# Patient Record
Sex: MALE | Race: WHITE | Employment: FULL TIME | URBAN - METROPOLITAN AREA
[De-identification: names, ages, dates, MRNs, and addresses within clinical notes are randomized per-mention and may not be internally consistent; named-entity substitution may affect disease eponyms.]

---

## 2018-03-29 ENCOUNTER — HOSPITAL ENCOUNTER (EMERGENCY)
Age: 38
Discharge: HOME | End: 2018-03-29
Attending: EMERGENCY MEDICINE | Admitting: EMERGENCY MEDICINE
Payer: COMMERCIAL

## 2018-03-29 VITALS
TEMPERATURE: 97.7 F | RESPIRATION RATE: 18 BRPM | SYSTOLIC BLOOD PRESSURE: 159 MMHG | WEIGHT: 233.69 LBS | DIASTOLIC BLOOD PRESSURE: 96 MMHG | OXYGEN SATURATION: 95 % | BODY MASS INDEX: 31.65 KG/M2 | HEIGHT: 72 IN | HEART RATE: 87 BPM

## 2018-03-29 DIAGNOSIS — T78.40XA ALLERGIC REACTION, INITIAL ENCOUNTER: Primary | ICD-10-CM

## 2018-03-29 PROCEDURE — 99283 EMERGENCY DEPT VISIT LOW MDM: CPT

## 2018-03-29 PROCEDURE — 74011250637 HC RX REV CODE- 250/637: Performed by: EMERGENCY MEDICINE

## 2018-03-29 PROCEDURE — 74011636637 HC RX REV CODE- 636/637: Performed by: EMERGENCY MEDICINE

## 2018-03-29 RX ORDER — DIPHENHYDRAMINE HCL 25 MG
25 CAPSULE ORAL
Qty: 10 CAP | Refills: 0 | Status: SHIPPED | OUTPATIENT
Start: 2018-03-29

## 2018-03-29 RX ORDER — PREDNISONE 20 MG/1
60 TABLET ORAL
Status: COMPLETED | OUTPATIENT
Start: 2018-03-29 | End: 2018-03-29

## 2018-03-29 RX ORDER — DIPHENHYDRAMINE HCL 25 MG
25 CAPSULE ORAL
Status: COMPLETED | OUTPATIENT
Start: 2018-03-29 | End: 2018-03-29

## 2018-03-29 RX ORDER — PREDNISONE 5 MG/1
TABLET ORAL
Qty: 48 TAB | Refills: 0 | Status: SHIPPED | OUTPATIENT
Start: 2018-03-29

## 2018-03-29 RX ORDER — FAMOTIDINE 20 MG/1
20 TABLET, FILM COATED ORAL 2 TIMES DAILY
Qty: 20 TAB | Refills: 0 | Status: SHIPPED | OUTPATIENT
Start: 2018-03-29

## 2018-03-29 RX ORDER — FAMOTIDINE 20 MG/1
20 TABLET, FILM COATED ORAL
Status: COMPLETED | OUTPATIENT
Start: 2018-03-29 | End: 2018-03-29

## 2018-03-29 RX ADMIN — PREDNISONE 60 MG: 20 TABLET ORAL at 04:24

## 2018-03-29 RX ADMIN — FAMOTIDINE 20 MG: 20 TABLET ORAL at 04:23

## 2018-03-29 RX ADMIN — DIPHENHYDRAMINE HYDROCHLORIDE 25 MG: 25 CAPSULE ORAL at 04:23

## 2018-03-29 NOTE — DISCHARGE INSTRUCTIONS

## 2018-03-29 NOTE — ED NOTES
The patient was discharged home by Dr. Naomi Alcocer and Barb Jonas rn in stable condition, accompanied by family. The patient is alert and oriented, is in no respiratory distress and has vital signs within normal limits . The patient's diagnosis, condition and treatment were explained to patient. The patient expressed understanding. Prescriptions given to pt. No work/school note given to pt. A discharge plan has been developed. A  was not involved in the process. Aftercare instructions were given to the patient. Family will transport pt home.

## 2018-03-29 NOTE — ED TRIAGE NOTES
Pt reports being itchy and awoke at 0300 this AM with redness and hives to back and chest. Pt denies any known exposure. Pt denies any breathing or throat issues. Pt is able to speak in complete sentences and is handling his oral secretions normally. Pt had crab for dinner. Call bell within reach. Pt took 1 tab of over the counter benadryl.

## 2018-08-07 ENCOUNTER — HOSPITAL ENCOUNTER (EMERGENCY)
Age: 38
Discharge: HOME | End: 2018-08-07
Attending: EMERGENCY MEDICINE
Payer: COMMERCIAL

## 2018-08-07 ENCOUNTER — APPOINTMENT (OUTPATIENT)
Dept: GENERAL RADIOLOGY | Age: 38
End: 2018-08-07
Attending: EMERGENCY MEDICINE
Payer: COMMERCIAL

## 2018-08-07 ENCOUNTER — APPOINTMENT (OUTPATIENT)
Dept: CT IMAGING | Age: 38
End: 2018-08-07
Attending: EMERGENCY MEDICINE
Payer: COMMERCIAL

## 2018-08-07 VITALS
RESPIRATION RATE: 24 BRPM | HEIGHT: 72 IN | DIASTOLIC BLOOD PRESSURE: 91 MMHG | BODY MASS INDEX: 31.56 KG/M2 | WEIGHT: 233 LBS | TEMPERATURE: 99.2 F | OXYGEN SATURATION: 93 % | HEART RATE: 97 BPM | SYSTOLIC BLOOD PRESSURE: 170 MMHG

## 2018-08-07 DIAGNOSIS — R50.9 FEVER, UNSPECIFIED FEVER CAUSE: Primary | ICD-10-CM

## 2018-08-07 DIAGNOSIS — R51.9 ACUTE NONINTRACTABLE HEADACHE, UNSPECIFIED HEADACHE TYPE: ICD-10-CM

## 2018-08-07 LAB
ANION GAP BLD CALC-SCNC: 18 MMOL/L (ref 10–20)
ANION GAP SERPL CALC-SCNC: 12 MMOL/L (ref 5–15)
APPEARANCE UR: CLEAR
BACTERIA URNS QL MICRO: NEGATIVE /HPF
BASOPHILS # BLD: 0 K/UL (ref 0–0.1)
BASOPHILS NFR BLD: 0 % (ref 0–1)
BILIRUB UR QL: NEGATIVE
BUN BLD-MCNC: 15 MG/DL (ref 9–20)
BUN SERPL-MCNC: 15 MG/DL (ref 6–20)
BUN/CREAT SERPL: 11 (ref 12–20)
CA-I BLD-MCNC: 1.04 MMOL/L (ref 1.12–1.32)
CALCIUM SERPL-MCNC: 8.4 MG/DL (ref 8.5–10.1)
CHLORIDE BLD-SCNC: 95 MMOL/L (ref 98–107)
CHLORIDE SERPL-SCNC: 96 MMOL/L (ref 97–108)
CO2 BLD-SCNC: 24 MMOL/L (ref 21–32)
CO2 SERPL-SCNC: 24 MMOL/L (ref 21–32)
COLOR UR: NORMAL
CREAT BLD-MCNC: 1.2 MG/DL (ref 0.6–1.3)
CREAT SERPL-MCNC: 1.4 MG/DL (ref 0.7–1.3)
DIFFERENTIAL METHOD BLD: ABNORMAL
EOSINOPHIL # BLD: 0 K/UL (ref 0–0.4)
EOSINOPHIL NFR BLD: 0 % (ref 0–7)
EPITH CASTS URNS QL MICRO: NORMAL /LPF
ERYTHROCYTE [DISTWIDTH] IN BLOOD BY AUTOMATED COUNT: 13.2 % (ref 11.5–14.5)
GLUCOSE BLD-MCNC: 102 MG/DL (ref 65–100)
GLUCOSE SERPL-MCNC: 97 MG/DL (ref 65–100)
GLUCOSE UR STRIP.AUTO-MCNC: NEGATIVE MG/DL
HCT VFR BLD AUTO: 39.9 % (ref 36.6–50.3)
HCT VFR BLD CALC: 41 % (ref 36.6–50.3)
HGB BLD-MCNC: 13.9 G/DL (ref 12.1–17)
HGB UR QL STRIP: NEGATIVE
HYALINE CASTS URNS QL MICRO: NORMAL /LPF (ref 0–5)
IMM GRANULOCYTES # BLD: 0 K/UL (ref 0–0.04)
IMM GRANULOCYTES NFR BLD AUTO: 0 % (ref 0–0.5)
KETONES UR QL STRIP.AUTO: NEGATIVE MG/DL
LACTATE SERPL-SCNC: 1.2 MMOL/L (ref 0.4–2)
LEUKOCYTE ESTERASE UR QL STRIP.AUTO: NEGATIVE
LYMPHOCYTES # BLD: 0.2 K/UL (ref 0.8–3.5)
LYMPHOCYTES NFR BLD: 3 % (ref 12–49)
MCH RBC QN AUTO: 30.1 PG (ref 26–34)
MCHC RBC AUTO-ENTMCNC: 34.8 G/DL (ref 30–36.5)
MCV RBC AUTO: 86.4 FL (ref 80–99)
MONOCYTES # BLD: 0.2 K/UL (ref 0–1)
MONOCYTES NFR BLD: 2 % (ref 5–13)
NEUTS SEG # BLD: 7.4 K/UL (ref 1.8–8)
NEUTS SEG NFR BLD: 95 % (ref 32–75)
NITRITE UR QL STRIP.AUTO: NEGATIVE
NRBC # BLD: 0 K/UL (ref 0–0.01)
NRBC BLD-RTO: 0 PER 100 WBC
PH UR STRIP: 7 [PH] (ref 5–8)
PLATELET # BLD AUTO: 178 K/UL (ref 150–400)
PMV BLD AUTO: 11 FL (ref 8.9–12.9)
POTASSIUM BLD-SCNC: 3.5 MMOL/L (ref 3.5–5.1)
POTASSIUM SERPL-SCNC: 3.5 MMOL/L (ref 3.5–5.1)
PROT UR STRIP-MCNC: NEGATIVE MG/DL
RBC # BLD AUTO: 4.62 M/UL (ref 4.1–5.7)
RBC #/AREA URNS HPF: NORMAL /HPF (ref 0–5)
RBC MORPH BLD: ABNORMAL
SERVICE CMNT-IMP: ABNORMAL
SODIUM BLD-SCNC: 133 MMOL/L (ref 136–145)
SODIUM SERPL-SCNC: 132 MMOL/L (ref 136–145)
SP GR UR REFRACTOMETRY: <1.005 (ref 1–1.03)
UA: UC IF INDICATED,UAUC: NORMAL
UROBILINOGEN UR QL STRIP.AUTO: 0.2 EU/DL (ref 0.2–1)
WBC # BLD AUTO: 7.8 K/UL (ref 4.1–11.1)
WBC URNS QL MICRO: NORMAL /HPF (ref 0–4)

## 2018-08-07 PROCEDURE — 96374 THER/PROPH/DIAG INJ IV PUSH: CPT

## 2018-08-07 PROCEDURE — 87040 BLOOD CULTURE FOR BACTERIA: CPT | Performed by: EMERGENCY MEDICINE

## 2018-08-07 PROCEDURE — 70450 CT HEAD/BRAIN W/O DYE: CPT

## 2018-08-07 PROCEDURE — 74011636320 HC RX REV CODE- 636/320: Performed by: EMERGENCY MEDICINE

## 2018-08-07 PROCEDURE — 93005 ELECTROCARDIOGRAM TRACING: CPT

## 2018-08-07 PROCEDURE — 71046 X-RAY EXAM CHEST 2 VIEWS: CPT

## 2018-08-07 PROCEDURE — 80048 BASIC METABOLIC PNL TOTAL CA: CPT | Performed by: EMERGENCY MEDICINE

## 2018-08-07 PROCEDURE — 36415 COLL VENOUS BLD VENIPUNCTURE: CPT | Performed by: EMERGENCY MEDICINE

## 2018-08-07 PROCEDURE — 99285 EMERGENCY DEPT VISIT HI MDM: CPT

## 2018-08-07 PROCEDURE — 81001 URINALYSIS AUTO W/SCOPE: CPT | Performed by: EMERGENCY MEDICINE

## 2018-08-07 PROCEDURE — 74011250637 HC RX REV CODE- 250/637: Performed by: EMERGENCY MEDICINE

## 2018-08-07 PROCEDURE — 74011250636 HC RX REV CODE- 250/636: Performed by: EMERGENCY MEDICINE

## 2018-08-07 PROCEDURE — 80047 BASIC METABLC PNL IONIZED CA: CPT

## 2018-08-07 PROCEDURE — 83605 ASSAY OF LACTIC ACID: CPT | Performed by: EMERGENCY MEDICINE

## 2018-08-07 PROCEDURE — 96375 TX/PRO/DX INJ NEW DRUG ADDON: CPT

## 2018-08-07 PROCEDURE — 77030019701 HC TY LUMBR PUNC SIMS -A

## 2018-08-07 PROCEDURE — 85025 COMPLETE CBC W/AUTO DIFF WBC: CPT | Performed by: EMERGENCY MEDICINE

## 2018-08-07 PROCEDURE — 96361 HYDRATE IV INFUSION ADD-ON: CPT

## 2018-08-07 PROCEDURE — 70496 CT ANGIOGRAPHY HEAD: CPT

## 2018-08-07 RX ORDER — DOXYCYCLINE HYCLATE 100 MG
100 TABLET ORAL 2 TIMES DAILY
Qty: 20 TAB | Refills: 0 | Status: SHIPPED | OUTPATIENT
Start: 2018-08-07 | End: 2018-08-17

## 2018-08-07 RX ORDER — ACETAMINOPHEN 500 MG
1000 TABLET ORAL ONCE
Status: COMPLETED | OUTPATIENT
Start: 2018-08-07 | End: 2018-08-07

## 2018-08-07 RX ORDER — METOCLOPRAMIDE HYDROCHLORIDE 5 MG/ML
10 INJECTION INTRAMUSCULAR; INTRAVENOUS
Status: COMPLETED | OUTPATIENT
Start: 2018-08-07 | End: 2018-08-07

## 2018-08-07 RX ORDER — DIPHENHYDRAMINE HYDROCHLORIDE 50 MG/ML
50 INJECTION, SOLUTION INTRAMUSCULAR; INTRAVENOUS
Status: COMPLETED | OUTPATIENT
Start: 2018-08-07 | End: 2018-08-07

## 2018-08-07 RX ADMIN — SODIUM CHLORIDE 1000 ML: 900 INJECTION, SOLUTION INTRAVENOUS at 19:19

## 2018-08-07 RX ADMIN — METOCLOPRAMIDE 10 MG: 5 INJECTION, SOLUTION INTRAMUSCULAR; INTRAVENOUS at 17:01

## 2018-08-07 RX ADMIN — DIPHENHYDRAMINE HYDROCHLORIDE 50 MG: 50 INJECTION, SOLUTION INTRAMUSCULAR; INTRAVENOUS at 17:01

## 2018-08-07 RX ADMIN — IOPAMIDOL 100 ML: 755 INJECTION, SOLUTION INTRAVENOUS at 18:04

## 2018-08-07 RX ADMIN — SODIUM CHLORIDE 1000 ML: 900 INJECTION, SOLUTION INTRAVENOUS at 17:01

## 2018-08-07 RX ADMIN — ACETAMINOPHEN 1000 MG: 500 TABLET ORAL at 18:10

## 2018-08-07 NOTE — ED TRIAGE NOTES
Patient reports headache starting at 1400 which then became generalized body aches and chills. Patient appears pale in triage. Patient adds right side numbness that just began.  patient taken to room 5 for rapid EKG

## 2018-08-07 NOTE — LETTER
[address] 
[facility] EMERGENCY DEPT 
[address] [facility] [address] [id number] [phone] Work/School Note Date: 8/7/2018 To Whom It May concern: 
 
Derick Grijalva was seen and treated today in the emergency room by the following provider(s): 
Attending Provider: Dione Lugo MD.   
 
Derick Grijalva may return to work on 8/10/18. Sincerely, Dione Lugo MD

## 2018-08-07 NOTE — ED PROVIDER NOTES
HPI Comments: Patient presents with a headache that started suddenly at 2pm and quickly worsened to an 8/10 by 3pm.  Patient then reported right sided numbness that developed into bilateral numbness and bilateral tingling as well as diffuse body aches and weakness. He denies focal weakness, fevers, chills, shortness of breath. Patient is a 41 y.o. male presenting with headaches. Headache    This is a new problem. The current episode started 3 to 5 hours ago. The problem occurs constantly. The problem has not changed since onset. The headache is aggravated by nothing. The pain is located in the generalized region. The quality of the pain is described as throbbing. The pain is at a severity of 8/10. Associated symptoms include malaise/fatigue, weakness, dizziness and nausea. Pertinent negatives include no fever, no shortness of breath, no visual change and no vomiting. He has tried nothing for the symptoms. The treatment provided no relief. No past medical history on file. Past Surgical History:   Procedure Laterality Date    HX ORTHOPAEDIC           No family history on file. Social History     Social History    Marital status: SINGLE     Spouse name: N/A    Number of children: N/A    Years of education: N/A     Occupational History    Not on file. Social History Main Topics    Smoking status: Never Smoker    Smokeless tobacco: Never Used    Alcohol use Yes      Comment: occasional    Drug use: No    Sexual activity: Not on file     Other Topics Concern    Not on file     Social History Narrative         ALLERGIES: Review of patient's allergies indicates no known allergies. Review of Systems   Constitutional: Positive for fatigue and malaise/fatigue. Negative for fever. Respiratory: Negative for shortness of breath. Gastrointestinal: Positive for nausea. Negative for vomiting. Musculoskeletal: Negative for neck pain and neck stiffness.    Neurological: Positive for dizziness, weakness, light-headedness, numbness and headaches. Negative for syncope. Vitals:    08/07/18 1621   BP: 133/90   Pulse: (!) 109   Resp: 22   Temp: 98.7 °F (37.1 °C)   SpO2: 96%   Weight: 105.7 kg (233 lb)   Height: 6' (1.829 m)            Physical Exam   Constitutional: He is oriented to person, place, and time. He appears well-developed. No distress. HENT:   Head: Normocephalic and atraumatic. Eyes: Pupils are equal, round, and reactive to light. No scleral icterus. Neck: Normal range of motion. Neck supple. No rigidity. Normal range of motion present. Cardiovascular: Normal rate and regular rhythm. Pulmonary/Chest: Effort normal and breath sounds normal. Tachypnea noted. Patient hyperventilating with short, shallow breaths   Abdominal: Soft. He exhibits no distension. There is no tenderness. There is no rebound and no guarding. Musculoskeletal: Normal range of motion. Neurological: He is alert and oriented to person, place, and time. No cranial nerve deficit or sensory deficit. GCS eye subscore is 4. GCS verbal subscore is 5. GCS motor subscore is 6. Equal strength bilaterally but poor effort secondary to pain. Skin: Skin is warm and dry. He is not diaphoretic. Psychiatric: His behavior is normal. Judgment and thought content normal. His mood appears anxious. His speech is rapid and/or pressured. Cognition and memory are normal.   Nursing note and vitals reviewed. MDM  Number of Diagnoses or Management Options  Diagnosis management comments: 7:01 PM  Patient presents with headache and fevers - no neck stiffness. I recommended that patient get an LP to evaluate for a possible bacterial meningitis given his symptoms on presentation but patient declines an LP at this time as he is feeling better after IV fluids and medication.   He has a supple neck and is currently curled up in the fetal position, so a meningitis is less likely; nevertheless, I reviewed the risks, signs, and symptoms of meningitis with the patient as well as the risks of an LP. He understands the risks of declining the procedure, including worsening untreated bacterial meningitis leading to disability and/or death, and will return if symptoms worsen or change. At this time the patient is now resting comfortably and feels better, is alert, talkative, interactive and in no distress. The patient appears well and is able to tolerate PO fluids. The repeat examination is unremarkable and benign. The patient is neurologically intact, has a normal mental status, and is ambulatory in the ED. The history, exam, diagnostic testing (if any) and the patient's current condition do not suggest meningitis, stroke, sepsis, subarachnoid hemorrhage, intracranial bleeding, encephalitis, or temporal arteritis. The patient is resting comfortably and feels better, is alert and in no distress. On re-examination the patient does not appear toxic and has no meningeal signs, and there is no intractable vomiting, no respiratory distress and no apparent pain. Based on the history, exam, diagnostic testing (if any) and reassessment, the patient has no signs of meningitis, significant pneumonia, or pyelonephritis. The patient's condition is stable and is appropriate for discharge. The patient or caregiver will pursue further outpatient evaluation with the primary care physician as indicated in the discharge instructions. ED Course       Procedures      The patient's results have been reviewed with them and/or available family. Patient and/or family verbally conveyed their understanding and agreement of the patient's signs, symptoms, diagnosis, treatment and prognosis and additionally agree to follow up as recommended in the discharge instructions or to return to the Emergency Room should their condition change prior to their follow-up appointment.  The patient/family verbally agrees with the care-plan and verbally conveys that all of their questions have been answered. The discharge instructions have also been provided to the patient and/or family with some educational information regarding the patient's diagnosis as well a list of reasons why the patient would want to return to the ER prior to their follow-up appointment, should their condition change.

## 2018-08-08 LAB
ATRIAL RATE: 104 BPM
CALCULATED P AXIS, ECG09: 52 DEGREES
CALCULATED R AXIS, ECG10: 61 DEGREES
CALCULATED T AXIS, ECG11: -7 DEGREES
DIAGNOSIS, 93000: NORMAL
P-R INTERVAL, ECG05: 116 MS
Q-T INTERVAL, ECG07: 324 MS
QRS DURATION, ECG06: 90 MS
QTC CALCULATION (BEZET), ECG08: 426 MS
VENTRICULAR RATE, ECG03: 104 BPM

## 2018-08-11 ENCOUNTER — HOSPITAL ENCOUNTER (EMERGENCY)
Age: 38
Discharge: HOME | End: 2018-08-11
Attending: EMERGENCY MEDICINE | Admitting: EMERGENCY MEDICINE
Payer: COMMERCIAL

## 2018-08-11 VITALS
TEMPERATURE: 96.7 F | OXYGEN SATURATION: 96 % | HEIGHT: 72 IN | WEIGHT: 230 LBS | SYSTOLIC BLOOD PRESSURE: 157 MMHG | DIASTOLIC BLOOD PRESSURE: 108 MMHG | RESPIRATION RATE: 12 BRPM | HEART RATE: 80 BPM | BODY MASS INDEX: 31.15 KG/M2

## 2018-08-11 DIAGNOSIS — F15.93 CAFFEINE WITHDRAWAL (HCC): Primary | ICD-10-CM

## 2018-08-11 DIAGNOSIS — G44.40 REBOUND HEADACHE: ICD-10-CM

## 2018-08-11 PROCEDURE — 74011250637 HC RX REV CODE- 250/637: Performed by: EMERGENCY MEDICINE

## 2018-08-11 PROCEDURE — 99282 EMERGENCY DEPT VISIT SF MDM: CPT

## 2018-08-11 RX ORDER — DIPHENHYDRAMINE HYDROCHLORIDE 50 MG/ML
25 INJECTION, SOLUTION INTRAMUSCULAR; INTRAVENOUS
Status: DISCONTINUED | OUTPATIENT
Start: 2018-08-11 | End: 2018-08-11

## 2018-08-11 RX ORDER — METOCLOPRAMIDE HYDROCHLORIDE 5 MG/ML
10 INJECTION INTRAMUSCULAR; INTRAVENOUS
Status: DISCONTINUED | OUTPATIENT
Start: 2018-08-11 | End: 2018-08-11

## 2018-08-11 RX ORDER — KETOROLAC TROMETHAMINE 30 MG/ML
15 INJECTION, SOLUTION INTRAMUSCULAR; INTRAVENOUS
Status: DISCONTINUED | OUTPATIENT
Start: 2018-08-11 | End: 2018-08-11

## 2018-08-11 RX ORDER — BUTALBITAL, ACETAMINOPHEN AND CAFFEINE 50; 325; 40 MG/1; MG/1; MG/1
2 TABLET ORAL
Status: COMPLETED | OUTPATIENT
Start: 2018-08-11 | End: 2018-08-11

## 2018-08-11 RX ORDER — DEXAMETHASONE SODIUM PHOSPHATE 10 MG/ML
10 INJECTION INTRAMUSCULAR; INTRAVENOUS
Status: DISCONTINUED | OUTPATIENT
Start: 2018-08-11 | End: 2018-08-11

## 2018-08-11 RX ADMIN — BUTALBITAL, ACETAMINOPHEN, AND CAFFEINE 2 TABLET: 50; 325; 40 TABLET ORAL at 17:34

## 2018-08-11 NOTE — ED TRIAGE NOTES
Pt states he has had a headache since Tuesday and was seen here then, pt states he still has the headache and has been taking tylenol and motrin at home with no relief, pt denies any nausea or vomiting.

## 2018-08-11 NOTE — ED PROVIDER NOTES
HPI Comments: 41 y.o. male with no significant past medical history who presents from home via a private vehicle with chief complaint of a headache. Pt reports the initial onset of his headache was on 8/7/18 at around 1400 and was seen here in the ED. Pt reports he was given Reglan and his headache improved. Pt reports he was discharged with a prescription for Doxycycline, was recommended to take motrin, and follow up with [facility] for any changes in his symptoms. Pt reports his headache, though less severe, returned soon after being discharged. Pt reports he went to [facility] on 8/9/18 for his worsening headache and was told to alternated between Motrin and Tylenol and to take Benadryl. Pt reports taking these medications has not relieved his headache. Furthermore, pt reports he is having difficulty sleeping secondary to his headache. In the ED, pt reports his headache is dull and constant pain across his upper forehead. Of note, pt admits he is used to drinking two Monster energy drinks daily. However, pt reports he has not had one since the morning of 8/7/18 because he developed a headache. Pt states he drinks the energy drinks because of symptoms from his history of low testosterone. Pt denies any fever, chills, dizziness, nausea, or vomiting. There are no other acute medical concerns at this time. Social hx - Tobacco use: never, Alcohol Use: yes    Note written by Lucas Mcpherson, as dictated by Sarthak De La Rosa MD 5:00 PM.        The history is provided by the patient. No  was used. No past medical history on file. Past Surgical History:   Procedure Laterality Date    HX ORTHOPAEDIC           No family history on file. Social History     Social History    Marital status: SINGLE     Spouse name: N/A    Number of children: N/A    Years of education: N/A     Occupational History    Not on file.      Social History Main Topics    Smoking status: Never Smoker    Smokeless tobacco: Never Used    Alcohol use Yes      Comment: occasional    Drug use: No    Sexual activity: Not on file     Other Topics Concern    Not on file     Social History Narrative         ALLERGIES: Review of patient's allergies indicates no known allergies. Review of Systems   Constitutional: Negative for chills and fever. HENT: Negative for ear pain and sore throat. Eyes: Negative for pain. Respiratory: Negative for chest tightness and shortness of breath. Cardiovascular: Negative for chest pain and leg swelling. Gastrointestinal: Negative for abdominal pain, nausea and vomiting. Genitourinary: Negative for dysuria and flank pain. Musculoskeletal: Negative for back pain. Skin: Negative for rash. Neurological: Positive for headaches. Negative for dizziness. All other systems reviewed and are negative. Vitals:    08/11/18 1605   BP: (!) 143/99   Pulse: 85   Resp: 18   Temp: 98.5 °F (36.9 °C)   SpO2: 97%   Weight: 104.3 kg (230 lb)   Height: 6' (1.829 m)            Physical Exam   Constitutional: He is oriented to person, place, and time. He appears well-developed and well-nourished. No distress. HENT:   Head: Normocephalic and atraumatic. Eyes: Conjunctivae and EOM are normal. Pupils are equal, round, and reactive to light. Neck: Neck supple. No tracheal deviation present. Cardiovascular: Normal rate and regular rhythm. Pulmonary/Chest: Effort normal. No respiratory distress. Abdominal: Soft. He exhibits no distension. Musculoskeletal: Normal range of motion. Neurological: He is alert and oriented to person, place, and time. Normal finger-nose-finger. Normal heel to shin. Normal rapid alternating movement. Skin: Skin is warm and dry. Psychiatric: He has a normal mood and affect. Nursing note and vitals reviewed.      Note written by Lucas Pérez, as dictated by Ellison Najjar, MD 6:11 PM.    MDM    41 y.o. male presents with headaches that have been persistent since intiial visit. Extensive workup including CT/CTA which showed no bleeding and no aneurysm. Previously discussed LP for further evaluation but had declined. Pt was drinking 2 monster energy drinks daily previous to onset and has since been abstinent from caffeine. He was provided 2 fioricet here and given a caffeinated soda with complete resolution. No longer having fevers which he had initially so I recommended completing course of doxy. Suspect caffeine withdrawal headache which responded well to treatment here. Discussed resuming caffeine at home and slow tapering to discontinuation. Plan to follow up with PCP as needed and return precautions discussed for worsening or new concerning symptoms. ED Course       Procedures    PROGRESS NOTE:  7:22 PM  Pt reports his headache has completely resolved. Pt states he feels well and agrees to plan as outlined with him. Will discharge pt home. 7:23 PM  Patient's results have been reviewed with them. Patient and/or family have verbally conveyed their understanding and agreement of the patient's signs, symptoms, diagnosis, treatment and prognosis and additionally agree to follow up as recommended or return to the Emergency Room should their condition change prior to follow-up. Discharge instructions have also been provided to the patient with some educational information regarding their diagnosis as well a list of reasons why they would want to return to the ER prior to their follow-up appointment should their condition change.

## 2018-08-11 NOTE — DISCHARGE INSTRUCTIONS

## 2018-08-11 NOTE — ED NOTES
The provider has reviewed discharge instructions with the patient. The patient verbalized understanding. The patient was able to ambulate to the exit with a steady gait and did not appear to be in any distress.

## 2018-08-12 LAB
BACTERIA SPEC CULT: NORMAL
SERVICE CMNT-IMP: NORMAL

## 2019-06-28 ENCOUNTER — OFFICE VISIT (OUTPATIENT)
Dept: PRIMARY CARE CLINIC | Age: 39
End: 2019-06-28

## 2019-06-28 VITALS
WEIGHT: 238.8 LBS | HEART RATE: 75 BPM | BODY MASS INDEX: 32.34 KG/M2 | SYSTOLIC BLOOD PRESSURE: 134 MMHG | RESPIRATION RATE: 16 BRPM | TEMPERATURE: 97.8 F | DIASTOLIC BLOOD PRESSURE: 92 MMHG | HEIGHT: 72 IN | OXYGEN SATURATION: 96 %

## 2019-06-28 DIAGNOSIS — M25.562 CHRONIC PAIN OF LEFT KNEE: ICD-10-CM

## 2019-06-28 DIAGNOSIS — M25.512 CHRONIC LEFT SHOULDER PAIN: ICD-10-CM

## 2019-06-28 DIAGNOSIS — Z00.00 ROUTINE PHYSICAL EXAMINATION: Primary | ICD-10-CM

## 2019-06-28 DIAGNOSIS — J40 BRONCHITIS: ICD-10-CM

## 2019-06-28 DIAGNOSIS — R79.89 LOW TESTOSTERONE IN MALE: ICD-10-CM

## 2019-06-28 DIAGNOSIS — Z76.89 ENCOUNTER TO ESTABLISH CARE: ICD-10-CM

## 2019-06-28 DIAGNOSIS — Z98.890 S/P RIGHT KNEE SURGERY: ICD-10-CM

## 2019-06-28 DIAGNOSIS — G89.29 CHRONIC PAIN OF LEFT KNEE: ICD-10-CM

## 2019-06-28 DIAGNOSIS — G89.29 CHRONIC LEFT SHOULDER PAIN: ICD-10-CM

## 2019-06-28 NOTE — PROGRESS NOTES
[facility]   [address], [address] [facility]  P: [phone]  F: [phone]      Chief Complaint   Patient presents with   [address]     two years ago was down and he [facility] and he was told about that he has low testosterone and no energy. did a year but was told that he is alergic cotton seed oil which is in the injection and he was hot and itchy and went to er       [address] is a 42 y.o. male who presents to clinic for Establish Care (two years ago was down and he went to [facility] and he was told about that he has low testosterone and no energy. did a year but was told that he is alergic cotton seed oil which is in the injection and he was hot and itchy and went to er). HPI:    Dalia Harper is a 47-year-old male who presents today as a new patient to establish care, and for full physical and will return for fasting labs. He denies a previous primary care provider. He states [facility] and underwent testosterone injections for about a year, but stopped therapy due to allergy to cottonseed oil which was in their injections. Today he presents with complaint of low sex drive, fatigue, and multiple joint complaints including left knee and left shoulder, history of previous surgery to his right knee. Psych/social: He is an [place], serving 3 tours. He has a 8year-old son was previously . He is currently working as both a  and delivers hay. He is a non-smoker and drinks alcohol 1-2 drinks on the weekend only. He drinks 1 Monster caffeinated soda daily. There are no active problems to display for this patient. No past medical history on file. Past Surgical History:   Procedure Laterality Date    HX ORTHOPAEDIC      2 on right knee will have one on the left knee soon.       Social History     Socioeconomic History    Marital status:      Spouse name: Not on file    Number of children: Not on file    Years of education: Not on file    Highest education level: Not on file   Occupational History    Not on file   Social Needs    Financial resource strain: Not on file    Food insecurity:     Worry: Not on file     Inability: Not on file    Transportation needs:     Medical: Not on file     Non-medical: Not on file   Tobacco Use    Smoking status: Never Smoker    Smokeless tobacco: Never Used   Substance and Sexual Activity    Alcohol use: Yes    Drug use: Never    Sexual activity: Yes     Partners: Female   Lifestyle    Physical activity:     Days per week: Not on file     Minutes per session: Not on file    Stress: Not on file   Relationships    Social connections:     Talks on phone: Not on file     Gets together: Not on file     Attends [employer] service: Not on file     Active member of club or organization: Not on file     Attends meetings of clubs or organizations: Not on file     Relationship status: Not on file    Intimate partner violence:     Fear of current or ex partner: Not on file     Emotionally abused: Not on file     Physically abused: Not on file     Forced sexual activity: Not on file   Other Topics Concern    Not on file   Social History Narrative    Not on file     No family history on file. No Known Allergies    The medications were reviewed and updated in the medical record. The past medical history, past surgical history, and family history were reviewed and updated in the medical record. REVIEW OF SYSTEMS   Review of Systems   Constitutional: Positive for malaise/fatigue. Negative for fever. HENT: Negative for congestion. Eyes: Negative for blurred vision and pain. Respiratory: Negative for cough and shortness of breath. Cardiovascular: Negative for chest pain and palpitations. Gastrointestinal: Negative for abdominal pain and heartburn. Genitourinary: Negative for frequency and urgency.    Musculoskeletal: Negative for joint pain and myalgias. Neurological: Negative for dizziness, tingling, sensory change, weakness and headaches. Psychiatric/Behavioral: Negative for depression, memory loss and substance abuse. PHYSICAL EXAM     Visit Vitals  BP (!) 134/92 (BP 1 Location: Left arm, BP Patient Position: Sitting)   Pulse 75   Temp 97.8 °F (36.6 °C) (Oral)   Resp 16   Ht 6' (1.829 m)   Wt 238 lb 12.8 oz (108.3 kg)   SpO2 96%   BMI 32.39 kg/m²       Physical Exam   Constitutional: He is oriented to person, place, and time and well-developed, well-nourished, and in no distress. Vital signs are normal.   BMI 32   HENT:   Head: Normocephalic and atraumatic. Right Ear: Hearing, tympanic membrane, external ear and ear canal normal.   Left Ear: Hearing, tympanic membrane, external ear and ear canal normal.   Nose: Nose normal.   Mouth/Throat: Uvula is midline, oropharynx is clear and moist and mucous membranes are normal.   Eyes: Right eye visual fields normal and left eye visual fields normal. Pupils are equal, round, and reactive to light. Conjunctivae, EOM and lids are normal.   Fundoscopic exam:       The right eye shows no AV nicking. The left eye shows no AV nicking. Neck: Trachea normal. No thyromegaly present. Cardiovascular: S1 normal, S2 normal and normal heart sounds. Exam reveals no gallop and no friction rub. No murmur heard. Pulmonary/Chest: Effort normal and breath sounds normal.   Abdominal: Soft. Normal appearance. There is no hepatosplenomegaly. There is no tenderness. Musculoskeletal: Normal range of motion. Left shoulder: He exhibits pain. Left knee: He exhibits swelling (positive crepitus). Neurological: He is alert and oriented to person, place, and time. He has normal reflexes. Gait normal.   Reflex Scores:       Patellar reflexes are 2+ on the right side and 2+ on the left side. Skin: Skin is warm, dry and intact.    Psychiatric: Mood, affect and judgment normal. ASSESSMENT/ PLAN   Diagnoses and all orders for this visit:    1. Routine physical examination  -     TESTOSTERONE, TOTAL, ADULT MALE  -     TSH 3RD GENERATION  -     METABOLIC PANEL, COMPREHENSIVE  -     CBC W/O DIFF  -     LIPID PANEL  Reviewed age appropriate safety and screening guidelines, as well as maintaining a healthy diet and exercise 150 minutes or more weekly. Wear SPF 15 or greater sunscreen and please wear seatbelt. Discussed safe sex practices. Patient with no further questions today. 2. Encounter to establish care    3. Bronchitis     Occurring episodically over the last 1 year, not currently an issue. 4. Chronic left shoulder pain    5. Low testosterone in male  -     TESTOSTERONE, TOTAL, ADULT MALE  -Discussed resuming testosterone therapy today if total testosterone is low. Patient may have to try AndroGel if he does not tolerate the injections. 6. S/P right knee surgery    7. Chronic pain of left knee    For multiple orthopedic complaints encourage patient to follow-up with the [facility]. He declines resources for me today. Disclaimer:  Advised patient to call back or return to office if symptoms worsen/change/persist.  Discussed expected course/resolution/complications of diagnosis in detail with patient.     Medication risks/benefits/alternatives discussed with patient. Patient was given an after visit summary which includes diagnoses, current medications, & vitals.      Discussed patient instructions and advised to read to all patient instructions regarding care.      Patient expressed understanding with the diagnosis and plan. This note will not be viewable in [address]         Rao Alvarado NP  6/28/2019        (This document has been electronically signed)

## 2019-06-28 NOTE — PROGRESS NOTES
Chief Complaint   Patient presents with   [address]     two years ago was down and he [facility] to [facility] and he was told about that he has low testosterone and no energy.   did a year but was told that he is alergic cotton seed oil which is in the injection and he was hot and itchy and went to er

## 2019-07-10 LAB
ALBUMIN SERPL-MCNC: 4.8 G/DL (ref 3.5–5.5)
ALBUMIN/GLOB SERPL: 2 {RATIO} (ref 1.2–2.2)
ALP SERPL-CCNC: 83 IU/L (ref 39–117)
ALT SERPL-CCNC: 28 IU/L (ref 0–44)
AST SERPL-CCNC: 22 IU/L (ref 0–40)
BILIRUB SERPL-MCNC: 0.4 MG/DL (ref 0–1.2)
BUN SERPL-MCNC: 14 MG/DL (ref 6–20)
BUN/CREAT SERPL: 12 (ref 9–20)
CALCIUM SERPL-MCNC: 9.6 MG/DL (ref 8.7–10.2)
CHLORIDE SERPL-SCNC: 102 MMOL/L (ref 96–106)
CHOLEST SERPL-MCNC: 196 MG/DL (ref 100–199)
CO2 SERPL-SCNC: 24 MMOL/L (ref 20–29)
CREAT SERPL-MCNC: 1.18 MG/DL (ref 0.76–1.27)
ERYTHROCYTE [DISTWIDTH] IN BLOOD BY AUTOMATED COUNT: 14.4 % (ref 12.3–15.4)
GLOBULIN SER CALC-MCNC: 2.4 G/DL (ref 1.5–4.5)
GLUCOSE SERPL-MCNC: 99 MG/DL (ref 65–99)
HCT VFR BLD AUTO: 43.7 % (ref 37.5–51)
HDLC SERPL-MCNC: 42 MG/DL
HGB BLD-MCNC: 15 G/DL (ref 13–17.7)
LDLC SERPL CALC-MCNC: 128 MG/DL (ref 0–99)
MCH RBC QN AUTO: 29.3 PG (ref 26.6–33)
MCHC RBC AUTO-ENTMCNC: 34.3 G/DL (ref 31.5–35.7)
MCV RBC AUTO: 85 FL (ref 79–97)
PLATELET # BLD AUTO: 255 X10E3/UL (ref 150–450)
POTASSIUM SERPL-SCNC: 4.6 MMOL/L (ref 3.5–5.2)
PROT SERPL-MCNC: 7.2 G/DL (ref 6–8.5)
RBC # BLD AUTO: 5.12 X10E6/UL (ref 4.14–5.8)
SODIUM SERPL-SCNC: 141 MMOL/L (ref 134–144)
TESTOST SERPL-MCNC: 312 NG/DL (ref 264–916)
TRIGL SERPL-MCNC: 132 MG/DL (ref 0–149)
TSH SERPL DL<=0.005 MIU/L-ACNC: 2.27 UIU/ML (ref 0.45–4.5)
VLDLC SERPL CALC-MCNC: 26 MG/DL (ref 5–40)
WBC # BLD AUTO: 4.6 X10E3/UL (ref 3.4–10.8)

## 2019-07-10 NOTE — PROGRESS NOTES
Please call- labs show high LDL cholesterol but otherwise are all at goal for him. Work on [employer] and increase exercise. Testosterone is at 312 so I would not resume injections.

## 2019-09-25 ENCOUNTER — OFFICE VISIT (OUTPATIENT)
Dept: PRIMARY CARE CLINIC | Age: 39
End: 2019-09-25

## 2019-09-25 VITALS
RESPIRATION RATE: 16 BRPM | HEIGHT: 72 IN | HEART RATE: 67 BPM | BODY MASS INDEX: 31.72 KG/M2 | DIASTOLIC BLOOD PRESSURE: 86 MMHG | SYSTOLIC BLOOD PRESSURE: 128 MMHG | WEIGHT: 234.2 LBS | TEMPERATURE: 97.8 F | OXYGEN SATURATION: 95 %

## 2019-09-25 DIAGNOSIS — N52.9 ERECTILE DYSFUNCTION, UNSPECIFIED ERECTILE DYSFUNCTION TYPE: ICD-10-CM

## 2019-09-25 DIAGNOSIS — J06.9 UPPER RESPIRATORY TRACT INFECTION, UNSPECIFIED TYPE: Primary | ICD-10-CM

## 2019-09-25 RX ORDER — SILDENAFIL 50 MG/1
TABLET, FILM COATED ORAL
Qty: 15 TAB | Refills: 0 | Status: SHIPPED | OUTPATIENT
Start: 2019-09-25

## 2019-09-25 RX ORDER — AZITHROMYCIN 250 MG/1
TABLET, FILM COATED ORAL
Qty: 6 TAB | Refills: 0 | Status: SHIPPED | OUTPATIENT
Start: 2019-09-25 | End: 2019-09-30

## 2019-09-25 NOTE — PROGRESS NOTES
[facility]   [address], [address] [facility]  P: [phone]  F: [phone]      Chief Complaint   Patient presents with    Cough     started on Sunday woke coughing up brown stuff.  states no other symptoms but it isnt getting better. SUBJECTIVE   Ceasar Rodriguez II is a 42 y.o. male who presents to clinic for Cough (started on Sunday woke coughing up brown stuff.  states no other symptoms but it isnt getting better. ). HPI:    Howard Quezada is a 57-year-old male who presents today for 5 days of a cough productive of green/brown sputum. He has been taking Mucinex OTC without much relief in symptoms. He denies any fevers or shortness of breath today. He was possibly exposed to toxic substances during his time in the [employer], and he states he is normally treated for [facility]ch[facility]  He states he has similar episodes at least twice a year of cough with sputum production. He is a current non-smoker. He states he is does not have a past medical history for seasonal allergies. He does continue to have issues with ED, specifically getting an erection. He is requesting medication today to help, as he states that his testosterone was low normal when last checked. There are no active problems to display for this patient. History reviewed. No pertinent past medical history. Past Surgical History:   Procedure Laterality Date    HX ORTHOPAEDIC      2 on right knee will have one on the left knee soon.       Social History     Socioeconomic History    Marital status:      Spouse name: Not on file    Number of children: Not on file    Years of education: Not on file    Highest education level: Not on file   Occupational History    Not on file   Social Needs    Financial resource strain: Not on file    Food insecurity:     Worry: Not on file     Inability: Not on file    Transportation needs:     Medical: Not on file     Non-medical: Not on file   Tobacco Use    Smoking status: Never Smoker    Smokeless tobacco: Never Used   Substance and Sexual Activity    Alcohol use: Yes    Drug use: Never    Sexual activity: Yes     Partners: Female   Lifestyle    Physical activity:     Days per week: Not on file     Minutes per session: Not on file    Stress: Not on file   Relationships    Social connections:     Talks on phone: Not on file     Gets together: Not on file     Attends Episcopal service: Not on file     Active member of club or organization: Not on file     Attends meetings of clubs or organizations: Not on file     Relationship status: Not on file    Intimate partner violence:     Fear of current or ex partner: Not on file     Emotionally abused: Not on file     Physically abused: Not on file     Forced sexual activity: Not on file   Other Topics Concern    Not on file   Social History Narrative    Not on file     History reviewed. No pertinent family history. No Known Allergies            The medications were reviewed and updated in the medical record. The past medical history, past surgical history, and family history were reviewed and updated in the medical record. REVIEW OF SYSTEMS   Review of Systems   Constitutional: Negative for fever and malaise/fatigue. HENT: Negative for congestion and sore throat. Eyes: Negative for blurred vision and pain. Respiratory: Positive for cough and sputum production. Negative for shortness of breath. Cardiovascular: Negative for chest pain and palpitations. Gastrointestinal: Negative for abdominal pain and heartburn. Genitourinary: Negative for frequency and urgency. Musculoskeletal: Negative for joint pain and myalgias. Neurological: Negative for dizziness, tingling, sensory change, weakness and headaches. Psychiatric/Behavioral: Negative for depression, memory loss and substance abuse.      PHYSICAL EXAM     Visit Vitals  /86 (BP 1 Location: Left arm, BP Patient Position: Sitting)   Pulse 67   Temp 97.8 °F (36.6 °C) (Oral)   Resp 16   Ht 6' (1.829 m)   Wt 234 lb 3.2 oz (106.2 kg)   SpO2 95%   BMI 31.76 kg/m²       Physical Exam   Constitutional: He is oriented to person, place, and time and well-developed, well-nourished, and in no distress. BMI 31.7   HENT:   Head: Normocephalic and atraumatic. Right Ear: External ear normal.   Left Ear: External ear normal.   Cardiovascular: Normal rate, regular rhythm, S1 normal, S2 normal and normal heart sounds. Exam reveals no gallop and no friction rub. No murmur heard. Pulmonary/Chest: Effort normal and breath sounds normal.   +Cough during exam.    Musculoskeletal: Normal range of motion. He exhibits no edema. Neurological: He is alert and oriented to person, place, and time. Gait normal.   Skin: Skin is warm and dry. Psychiatric: Affect and judgment normal.   Nursing note and vitals reviewed. ASSESSMENT/ PLAN   Diagnoses and all orders for this visit:    1. Upper respiratory tract infection, unspecified type       -Encouraged OTC antihistamine for 5 days to see if cough is related to seasonal allergies. Traveling over the weekend, so provided with antibiotic in case symptoms worsen. -     azithromycin (ZITHROMAX) 250 mg tablet; Take 2 tablets today, then take 1 tablet daily    2. Erectile dysfunction, unspecified erectile dysfunction type  -     sildenafil citrate (VIAGRA) 50 mg tablet; Take 1 tab as needed 1 hr prior to sex.  -Counseled on side effects of a Phosphodiesterase-5 Enzyme Inhibitor. Please do not combine with alcohol. Adverse events with sildenafil included flushing,blurry vision, headaches, and dyspepsia. Patient is not taking any nitrates. I have counseled him that taking these medications with nitrates of any form can cause death. Patient was instructed to seek immediate medical attention for erection lasting >4 hours, chest pain, or vision loss.        Disclaimer:  Advised patient to call back or return to office if symptoms worsen/change/persist.  Discussed expected course/resolution/complications of diagnosis in detail with patient.     Medication risks/benefits/alternatives discussed with patient. Patient was given an after visit summary which includes diagnoses, current medications, & vitals.      Discussed patient instructions and advised to read to all patient instructions regarding care.      Patient expressed understanding with the diagnosis and plan. This note will not be viewable in [address]         Desean Travis NP  9/25/2019        (This document has been electronically signed)

## 2019-09-25 NOTE — PROGRESS NOTES
Chief Complaint   Patient presents with    Cough     started on Sunday woke coughing up brown stuff.  states no other symptoms but it isnt getting better.       Patient has been taking mucinex and it is really not helping

## 2021-02-23 ENCOUNTER — VIRTUAL VISIT (OUTPATIENT)
Dept: PRIMARY CARE CLINIC | Age: 41
End: 2021-02-23
Payer: COMMERCIAL

## 2021-02-23 DIAGNOSIS — R10.32 LEFT LOWER QUADRANT ABDOMINAL PAIN: Primary | ICD-10-CM

## 2021-02-23 PROCEDURE — 99212 OFFICE O/P EST SF 10 MIN: CPT | Performed by: NURSE PRACTITIONER

## 2021-02-23 NOTE — PROGRESS NOTES
[facility]   [address]vet Jacobo [address] [facility], [address] [place] [address]  P: [phone]  F: [phone] Sam Schuster II is a 47 y.o. male who is seen over telehealth for Abdominal Pain, for about 2 days. Reports left lower back pain that is now hurting him on his left lower abdomen. He does report recently working on a car and leaning on his left side prior to the pain starting. He is also wondering if is related to the way he slept. He is unsure if it is a possible muscle pull, denies prior kidney stone. No current fevers, changes to urinary or bowel habits. There are no active problems to display for this patient. No past medical history on file. Past Surgical History:   Procedure Laterality Date    HX ORTHOPAEDIC      HX ORTHOPAEDIC      2 on right knee will have one on the left knee soon.       Social History     Socioeconomic History    Marital status: UNKNOWN     Spouse name: Not on file    Number of children: Not on file    Years of education: Not on file    Highest education level: Not on file   Occupational History    Not on file   Social Needs    Financial resource strain: Not on file    Food insecurity     Worry: Not on file     Inability: Not on file    Transportation needs     Medical: Not on file     Non-medical: Not on file   Tobacco Use    Smoking status: Never Smoker    Smokeless tobacco: Never Used   Substance and Sexual Activity    Alcohol use: Yes     Comment: occasional    Drug use: Never    Sexual activity: Yes     Partners: Female   Lifestyle    Physical activity     Days per week: Not on file     Minutes per session: Not on file    Stress: Not on file   Relationships    Social connections     Talks on phone: Not on file     Gets together: Not on file     Attends Buddhist service: Not on file     Active member of club or organization: Not on file     Attends meetings of clubs or organizations: Not on file     Relationship status: Not on file    Intimate partner violence     Fear of current or ex partner: Not on file     Emotionally abused: Not on file     Physically abused: Not on file     Forced sexual activity: Not on file   Other Topics Concern    Not on file   Social History Narrative    ** Merged History Encounter **          No family history on file. No Known Allergies    Current Outpatient Medications   Medication Sig Dispense Refill    sildenafil citrate (VIAGRA) 50 mg tablet Take 1 tab as needed 1 hr prior to sex. 15 Tab 0    diphenhydrAMINE (BENADRYL) 25 mg capsule Take 1 Cap by mouth nightly as needed. 10 Cap 0    famotidine (PEPCID) 20 mg tablet Take 1 Tab by mouth two (2) times a day. 20 Tab 0    predniSONE (STERAPRED) 5 mg dose pack Take as directed on package. 48 Tab 0    tapentadol (NUCYNTA) 50 mg tablet Take 50 mg by mouth every six (6) hours as needed for Pain. Max Daily Amount: 200 mg. 15 Tab 0    propoxyphene napsylate-acetaminophen (DARVOCET-N 100) 100-650 mg per tablet Take 1 Tab by mouth every six (6) hours as needed for Pain. 20 Tab 0           The medications were reviewed and updated in the medical record. The past medical history, past surgical history, and family history were reviewed and updated in the medical record. REVIEW OF SYSTEMS   Review of Systems   Constitutional: Negative for fever and malaise/fatigue. HENT: Negative for congestion. Eyes: Negative for blurred vision and pain. Respiratory: Negative for cough and shortness of breath. Cardiovascular: Negative for chest pain and palpitations. Gastrointestinal: Positive for abdominal pain. Negative for blood in stool, constipation, diarrhea, heartburn, melena, nausea and vomiting. Genitourinary: Negative for frequency and urgency. Musculoskeletal: Negative for joint pain and myalgias. Neurological: Negative for dizziness, tingling, sensory change, weakness and headaches.    Psychiatric/Behavioral: Negative for depression, memory loss and substance abuse. PHYSICAL EXAM   NO VITALS WERE TAKEN FOR THIS VISIT    Physical Exam  Vitals signs and nursing note reviewed. Constitutional:       Appearance: Normal appearance. HENT:      Head: Normocephalic and atraumatic. Right Ear: External ear normal.      Left Ear: External ear normal.   Pulmonary:      Effort: Pulmonary effort is normal.   Musculoskeletal: Normal range of motion. Skin:     General: Skin is warm and dry. Neurological:      Mental Status: He is alert and oriented to person, place, and time. Mental status is at baseline. Gait: Gait is intact. Psychiatric:         Mood and Affect: Affect normal.         Speech: Speech normal.         Thought Content: Thought content normal.         Judgment: Judgment normal.       ASSESSMENT/ PLAN   Diagnoses and all orders for this visit:    1. Left lower quadrant abdominal pain      -Evaluation is quite limited as this is a telehealth encounter, possible muscle strain. Advised naproxen 2 tabs twice daily for 4-5 days. Increase hydration, if not improving needs an in office evaluation. I was at home while conducting this encounter. Consent:  He and/or his healthcare decision maker is aware that this patient-initiated Telehealth encounter is a billable service, with coverage as determined by his insurance carrier. He is aware that he may receive a bill and has provided verbal consent to proceed: Yes    This virtual visit was conducted via [address] Pursuant to the emergency declaration under the [address] [address] waiver authority and the [employer] Act, this Virtual  Visit was conducted to reduce the patient's risk of exposure to COVID-19 and provide continuity of care for an established patient. Services were provided through a video synchronous discussion virtually to substitute for in-person clinic visit.   Due to this being a TeleHealth evaluation, many elements of the physical examination are unable to be assessed. Total Time: minutes: 11-20 minutes. Disclaimer:  Advised patient to call back or return to office if symptoms worsen/change/persist.  Discussed expected course/resolution/complications of diagnosis in detail with patient. Medication risks/benefits/alternatives discussed with patient. Patient was given an after visit summary which includes diagnoses, current medications, & vitals. Discussed patient instructions and advised to read to all patient instructions regarding care. Patient expressed understanding with the diagnosis and plan. This note will not be viewable in [address]         Naun Caban NP  2/23/2021        (This document has been electronically signed)

## 2021-04-29 ENCOUNTER — OFFICE VISIT (OUTPATIENT)
Dept: PRIMARY CARE CLINIC | Age: 41
End: 2021-04-29
Payer: COMMERCIAL

## 2021-04-29 ENCOUNTER — HOSPITAL ENCOUNTER (OUTPATIENT)
Dept: GENERAL RADIOLOGY | Age: 41
Discharge: HOME | End: 2021-04-29
Payer: COMMERCIAL

## 2021-04-29 VITALS
WEIGHT: 240 LBS | SYSTOLIC BLOOD PRESSURE: 120 MMHG | HEART RATE: 68 BPM | TEMPERATURE: 98 F | OXYGEN SATURATION: 96 % | HEIGHT: 72 IN | RESPIRATION RATE: 16 BRPM | DIASTOLIC BLOOD PRESSURE: 76 MMHG | BODY MASS INDEX: 32.51 KG/M2

## 2021-04-29 DIAGNOSIS — M77.11 LATERAL EPICONDYLITIS OF RIGHT ELBOW: ICD-10-CM

## 2021-04-29 DIAGNOSIS — M77.01 MEDIAL EPICONDYLITIS OF RIGHT ELBOW: ICD-10-CM

## 2021-04-29 DIAGNOSIS — M77.01 MEDIAL EPICONDYLITIS OF RIGHT ELBOW: Primary | ICD-10-CM

## 2021-04-29 PROCEDURE — 73080 X-RAY EXAM OF ELBOW: CPT

## 2021-04-29 PROCEDURE — 99213 OFFICE O/P EST LOW 20 MIN: CPT | Performed by: FAMILY MEDICINE

## 2021-04-29 NOTE — PATIENT INSTRUCTIONS
Golfer's Elbow: Exercises Introduction Here are some examples of exercises for you to try. The exercises may be suggested for a condition or for rehabilitation. Start each exercise slowly. Ease off the exercises if you start to have pain. You will be told when to start these exercises and which ones will work best for you. How to do the exercises Wrist extensor stretch 1. Extend your affected arm in front of you and make a fist with your palm facing down. 2. Bend your wrist so that your fist points toward the floor. 3. With your other hand, gently bend your wrist farther until you feel a mild to moderate stretch in your forearm. 4. Hold for at least 15 to 30 seconds. 5. Repeat 2 to 4 times. 6. Repeat steps 1 through 5 with your fingers pointing toward the floor. Forearm extensor stretch 1. Place your affected elbow down at your side, bent at about 90 degrees. Then make a fist with your palm facing down. 2. Keeping your wrist bent, slowly straighten your elbow so your arm is down at your side. Then twist your fist out so your palm is facing out to the side and you feel a stretch. 3. Hold for at least 15 to 30 seconds. 4. Repeat 2 to 4 times. Wrist flexor stretch 1. Extend your affected arm in front of you with your palm facing away from your body. 2. Bend back your wrist, pointing your hand up toward the ceiling. 3. With your other hand, gently bend your wrist farther until you feel a mild to moderate stretch in your forearm. 4. Hold for at least 15 to 30 seconds. 5. Repeat 2 to 4 times. 6. Repeat steps 1 through 5, but this time extend your affected arm in front of you with your palm facing up. Then bend back your wrist, pointing your hand toward the floor. Wrist curls 1. Place your forearm on a table with your hand hanging over the edge of the table, palm up. 2. Place a 1- to 2-pound weight in your hand. This may be a dumbbell, a can of food, or a filled water bottle. 3. Slowly raise and lower the weight while keeping your forearm on the table and your palm facing up. 4. Repeat this motion 8 to 12 times. 5. Switch arms, and do steps 1 through 4. 
6. Repeat with your hand facing down toward the floor. Switch arms. Resisted wrist extension 1. Sit leaning forward with your legs slightly spread. Then place your affected forearm on your thigh with your hand and wrist in front of your knee. 2. Grasp one end of an exercise band with your palm down, and step on the other end. 
3. Slowly bend your wrist upward for a count of 2, then lower your wrist slowly to a count of 5. 
4. Repeat 8 to 12 times. Resisted wrist flexion 1. Sit leaning forward with your legs slightly spread. Then place your affected forearm on your thigh with your hand and wrist in front of your knee. 2. Grasp one end of an exercise band with your palm up, and step on the other end. 
3. Slowly bend your wrist upward for a count of 2, then lower your wrist slowly to a count of 5. 
4. Repeat 8 to 12 times. Neck stretch to the side 1. This stretch works best if you keep your shoulder down as you lean away from it. To help you remember to do this, start by relaxing your shoulders and lightly holding on to your thighs or your chair. 2. Tilt your head away from your affected elbow and toward your opposite shoulder. For example, if your right elbow is sore, keep your right shoulder down as you lean your head toward your left shoulder. 3. Hold for 15 to 30 seconds. Let the weight of your head stretch your muscles. 4. If you would like a little added stretch, use your hand to gently and steadily pull your head toward your shoulder. For example, if your right elbow is sore, use your left hand to gently pull your head toward your left shoulder. 5. Repeat 2 to 4 times. Resisted forearm pronation 1. Sit leaning forward with your legs slightly spread.  Then place your affected forearm on your thigh with your hand and wrist in front of your knee. 2. Grasp one end of an exercise band with your palm up, and step on the other end. 3. Keeping your wrist straight, roll your palm inward toward your thigh for a count of 2, then slowly move your wrist back to the starting position to a count of 5. 
4. Repeat 8 to 12 times. Resisted supination 1. Sit leaning forward with your legs slightly spread. Then place your affected forearm on your thigh with your hand and wrist in front of your knee. 2. Grasp one end of an exercise band with your palm down, and step on the other end. 3. Keeping your wrist straight, roll your palm outward and away from your thigh for a count of 2, then slowly move your wrist back to the starting position to a count of 5. 
4. Repeat 8 to 12 times. Follow-up care is a key part of your treatment and safety. Be sure to make and go to all appointments, and call your doctor if you are having problems. It's also a good idea to know your test results and keep a list of the medicines you take. Where can you learn more? Go to http://www.gray.com/ Enter (20) 2357 [id number] in the search box to learn more about \"Golfer's Elbow: Exercises. \" Current as of: November 16, 2020               Content Version: 12.8 © 2006-2021 [employer]. Care instructions adapted under license by [employer] (which disclaims liability or warranty for this information). If you have questions about a medical condition or this instruction, always ask your healthcare professional. [place] [address] any warranty or liability for your use of this information. 
  
  
  
Tennis Elbow: Exercises Introduction Here are some examples of exercises for you to try. The exercises may be suggested for a condition or for rehabilitation. Start each exercise slowly. Ease off the exercises if you start to have pain.  
You will be told when to start these exercises and which ones will work best for you. How to do the exercises Wrist flexor stretch 1. Extend your arm in front of you with your palm up. 2. Bend your wrist, pointing your hand toward the floor. 3. With your other hand, gently bend your wrist farther until you feel a mild to moderate stretch in your forearm. 4. Hold for at least 15 to 30 seconds. Repeat 2 to 4 times. Wrist extensor stretch 1. Repeat steps 1 to 4 of the stretch above but begin with your extended hand palm down. Ball or sock squeeze 1. Hold a tennis ball (or a rolled-up sock) in your hand. 2. Make a fist around the ball (or sock) and squeeze. 3. Hold for about 6 seconds, and then relax for up to 10 seconds. 4. Repeat 8 to 12 times. 5. Switch the ball (or sock) to your other hand and do 8 to 12 times. Wrist deviation 1. Sit so that your arm is supported but your hand hangs off the edge of a flat surface, such as a table. 2. Hold your hand out like you are shaking hands with someone. 3. Move your hand up and down. 4. Repeat this motion 8 to 12 times. 5. Switch arms. 6. Try to do this exercise twice with each hand. Wrist curls 1. Place your forearm on a table with your hand hanging over the edge of the table, palm up. 2. Place a 1- to 2-pound weight in your hand. This may be a dumbbell, a can of food, or a filled water bottle. 3. Slowly raise and lower the weight while keeping your forearm on the table and your palm facing up. 4. Repeat this motion 8 to 12 times. 5. Switch arms, and do steps 1 through 4. 
6. Repeat with your hand facing down toward the floor. Switch arms. Biceps curls 1. Sit leaning forward with your legs slightly spread and your left hand on your left thigh. 2. Place your right elbow on your right thigh, and hold the weight with your forearm horizontal. 
3. Slowly curl the weight up and toward your chest. 
4. Repeat this motion 8 to 12 times. 5. Switch arms, and do steps 1 through 4.    
Follow-up care is a key part of your treatment and safety. Be sure to make and go to all appointments, and call your doctor if you are having problems. It's also a good idea to know your test results and keep a list of the medicines you take. Where can you learn more? Go to http://www.gray.com/ Enter S331 in the search box to learn more about \"Tennis Elbow: Exercises. \" Current as of: November 16, 2020               Content Version: 12.8 © 2006-2021 [employer]. Care instructions adapted under license by [employer] (which disclaims liability or warranty for this information).  If you have questions about a medical condition or this instruction, always ask your healthcare professional. [address] any warranty or liability for your use of this information.

## 2021-04-29 NOTE — PROGRESS NOTES
Identified pt with two pt identifiers(name and ). Chief Complaint   Patient presents with    Elbow Pain     R elbow - could be from old injury - but burning ( uses bio sleeve ) has been getting worse burning not going away         3 most recent PHQ Screens 2021   Little interest or pleasure in doing things Not at all   Feeling down, depressed, irritable, or hopeless Not at all   Total Score PHQ 2 0        Vitals:    21 0802   BP: 120/76   Pulse: 68   Resp: 16   Temp: 98 °F (36.7 °C)   TempSrc: Temporal   SpO2: 96%   Weight: 240 lb (108.9 kg)   Height: 6' (1.829 m)   PainSc:   6   PainLoc: Elbow       Health Maintenance Due   Topic    Hepatitis C Screening     COVID-19 Vaccine (1)       1. Have you been to the ER, urgent care clinic since your last visit? Hospitalized since your last visit? No    2. Have you seen or consulted any other health care providers outside of the [address] [place] [address] since your last visit? Include any pap smears or colon screening.  No

## 2021-04-29 NOTE — PROGRESS NOTES
HPI     Chief Complaint   Patient presents with    Elbow Pain     R elbow - could be from old injury - but burning ( uses bio sleeve ) has been getting worse burning not going away      He is a 46 y.o. male who presents for elbow pain. Hurts on lateral and medial side. Ongoing for 5+ years. Feels when he overworks it. Takes biosleeve, Aleve and typically goes away. Constant and can't get rid of it now.  and works at a hay farm as well 3x weekly. Right handed. No trauma or injury. States sometimes if he lays on his R arm at night he will feel some tingling in his R hand. Review of Systems   Musculoskeletal: Negative for joint swelling and neck pain. Skin: Negative for color change. Neurological: Positive for numbness. Negative for weakness. Reviewed PmHx, RxHx, FmHx, SocHx, AllgHx and updated and dated in the chart. Physical Exam:  Visit Vitals  /76 (BP 1 Location: Left upper arm, BP Patient Position: Sitting, BP Cuff Size: Adult)   Pulse 68   Temp 98 °F (36.7 °C) (Temporal)   Resp 16   Ht 6' (1.829 m)   Wt 240 lb (108.9 kg)   SpO2 96%   BMI 32.55 kg/m²     Physical Exam  Vitals signs and nursing note reviewed. Constitutional:       General: He is not in acute distress. Appearance: Normal appearance. He is not ill-appearing. Cardiovascular:      Rate and Rhythm: Normal rate and regular rhythm. Heart sounds: No murmur. Pulmonary:      Effort: Pulmonary effort is normal. No respiratory distress. Breath sounds: Normal breath sounds. Musculoskeletal:      Comments: No erythema, atrophy or gross deformity on inspection. Mild tenderness to palpation over medial and lateral epicondylitis. Full ROM with supination and pronation of the R elbow. Pain is reproduced with supination and pronation, worse with supination. Strength 5/5 with flexion, extension, supination, pronation of R elbow.  strength intact. Gross sensation intact.     Skin:     General: Skin is warm and dry. Neurological:      General: No focal deficit present. Mental Status: He is alert. Psychiatric:         Mood and Affect: Mood normal.         Behavior: Behavior normal.       No results found for this or any previous visit (from the past 12 hour(s)). Assessment / Plan     Diagnoses and all orders for this visit:    1. Medial epicondylitis of right elbow  -     XR ELBOW RT MIN 3 V; Future  -     REFERRAL TO PHYSICAL THERAPY    2. Lateral epicondylitis of right elbow  -     XR ELBOW RT MIN 3 V; Future  -     REFERRAL TO PHYSICAL THERAPY       Will obtain imaging given this is a chronic problem x 5 years. Recommend ice, rest, NSAIDs and PT. Avoid aggravating activities. If not improving with PT recommend referral to Ortho. If worsening RTC sooner. He is agreeable to plan. I have discussed the diagnosis with the patient and the intended plan as seen in the above orders. The patient has received an after-visit summary and questions were answered concerning future plans. I have discussed medication side effects and warnings with the patient as well.     Skye Hassan, DO

## 2021-10-09 ENCOUNTER — APPOINTMENT (OUTPATIENT)
Dept: CT IMAGING | Age: 41
End: 2021-10-09
Attending: EMERGENCY MEDICINE
Payer: COMMERCIAL

## 2021-10-09 ENCOUNTER — HOSPITAL ENCOUNTER (EMERGENCY)
Age: 41
Discharge: HOME | End: 2021-10-09
Attending: EMERGENCY MEDICINE
Payer: COMMERCIAL

## 2021-10-09 VITALS
BODY MASS INDEX: 31.23 KG/M2 | HEART RATE: 92 BPM | TEMPERATURE: 98.3 F | HEIGHT: 72 IN | WEIGHT: 230.6 LBS | RESPIRATION RATE: 16 BRPM | DIASTOLIC BLOOD PRESSURE: 93 MMHG | OXYGEN SATURATION: 94 % | SYSTOLIC BLOOD PRESSURE: 127 MMHG

## 2021-10-09 DIAGNOSIS — S61.512A WRIST LACERATION, LEFT, INITIAL ENCOUNTER: Primary | ICD-10-CM

## 2021-10-09 LAB
CA-I BLD-MCNC: 1.17 MMOL/L (ref 1.12–1.32)
CHLORIDE BLD-SCNC: 103 MMOL/L (ref 100–108)
CREAT UR-MCNC: 1.2 MG/DL (ref 0.6–1.3)
GLUCOSE BLD STRIP.AUTO-MCNC: 102 MG/DL (ref 74–106)
HCO3 BLDA-SCNC: 27 MMOL/L
PCO2 BLDV: 43 MMHG (ref 41–51)
PH BLDV: 7.41 [PH] (ref 7.32–7.42)
PO2 BLDV: 37 MMHG (ref 25–40)
POTASSIUM BLD-SCNC: 3.5 MMOL/L (ref 3.5–5.5)
SODIUM BLD-SCNC: 142 MMOL/L (ref 136–145)
SPECIMEN SITE: NORMAL

## 2021-10-09 PROCEDURE — 82330 ASSAY OF CALCIUM: CPT

## 2021-10-09 PROCEDURE — 75810000293 HC SIMP/SUPERF WND  RPR

## 2021-10-09 PROCEDURE — 99284 EMERGENCY DEPT VISIT MOD MDM: CPT

## 2021-10-09 PROCEDURE — 74011000636 HC RX REV CODE- 636: Performed by: EMERGENCY MEDICINE

## 2021-10-09 PROCEDURE — 74011250636 HC RX REV CODE- 250/636: Performed by: EMERGENCY MEDICINE

## 2021-10-09 PROCEDURE — 74011000250 HC RX REV CODE- 250: Performed by: EMERGENCY MEDICINE

## 2021-10-09 PROCEDURE — 96374 THER/PROPH/DIAG INJ IV PUSH: CPT

## 2021-10-09 PROCEDURE — 74011250637 HC RX REV CODE- 250/637: Performed by: EMERGENCY MEDICINE

## 2021-10-09 PROCEDURE — 73201 CT UPPER EXTREMITY W/DYE: CPT

## 2021-10-09 RX ORDER — LIDOCAINE HYDROCHLORIDE AND EPINEPHRINE 10; 10 MG/ML; UG/ML
1.5 INJECTION, SOLUTION INFILTRATION; PERINEURAL ONCE
Status: COMPLETED | OUTPATIENT
Start: 2021-10-09 | End: 2021-10-09

## 2021-10-09 RX ORDER — CEPHALEXIN 250 MG/1
500 CAPSULE ORAL
Status: COMPLETED | OUTPATIENT
Start: 2021-10-09 | End: 2021-10-09

## 2021-10-09 RX ORDER — CEPHALEXIN 500 MG/1
500 CAPSULE ORAL 4 TIMES DAILY
Qty: 28 CAPSULE | Refills: 0 | Status: SHIPPED | OUTPATIENT
Start: 2021-10-09 | End: 2021-10-09 | Stop reason: SDUPTHER

## 2021-10-09 RX ORDER — BACITRACIN 500 UNIT/G
1 PACKET (EA) TOPICAL
Status: COMPLETED | OUTPATIENT
Start: 2021-10-09 | End: 2021-10-09

## 2021-10-09 RX ORDER — KETOROLAC TROMETHAMINE 30 MG/ML
30 INJECTION, SOLUTION INTRAMUSCULAR; INTRAVENOUS
Status: COMPLETED | OUTPATIENT
Start: 2021-10-09 | End: 2021-10-09

## 2021-10-09 RX ORDER — CEPHALEXIN 500 MG/1
500 CAPSULE ORAL 4 TIMES DAILY
Qty: 28 CAPSULE | Refills: 0 | Status: SHIPPED | OUTPATIENT
Start: 2021-10-09 | End: 2021-10-16

## 2021-10-09 RX ADMIN — CEPHALEXIN 500 MG: 250 CAPSULE ORAL at 21:05

## 2021-10-09 RX ADMIN — LIDOCAINE HYDROCHLORIDE,EPINEPHRINE BITARTRATE 15 MG: 10; .01 INJECTION, SOLUTION INFILTRATION; PERINEURAL at 17:48

## 2021-10-09 RX ADMIN — BACITRACIN 1 PACKET: 500 OINTMENT TOPICAL at 21:05

## 2021-10-09 RX ADMIN — KETOROLAC TROMETHAMINE 30 MG: 30 INJECTION, SOLUTION INTRAMUSCULAR at 21:05

## 2021-10-09 RX ADMIN — IOPAMIDOL 100 ML: 755 INJECTION, SOLUTION INTRAVENOUS at 20:16

## 2021-10-09 NOTE — ED PROVIDER NOTES
61-year-old male presents with a laceration to the left wrist.  Patient was using a pocket knife earlier when it slipped and cut him in the left wrist.  He initially noticed dark red oozing blood but no spurting blood. He has been able to flex and extend all of the fingers of his left hand. His last tetanus shot was 4 years ago. No past medical history on file. Past Surgical History:   Procedure Laterality Date    HX ORTHOPAEDIC      HX ORTHOPAEDIC      2 on right knee will have one on the left knee soon. Family History:   Problem Relation Age of Onset    No Known Problems Mother     No Known Problems Father        Social History     Socioeconomic History    Marital status:      Spouse name: Not on file    Number of children: Not on file    Years of education: Not on file    Highest education level: Not on file   Occupational History    Not on file   Tobacco Use    Smoking status: Never Smoker    Smokeless tobacco: Never Used   Vaping Use    Vaping Use: Never used   Substance and Sexual Activity    Alcohol use: Yes     Comment: occasional    Drug use: Never    Sexual activity: Yes     Partners: Female   Other Topics Concern    Not on file   Social History Narrative    ** Merged History Encounter **          Social Determinants of Health     Financial Resource Strain:     Difficulty of Paying Living Expenses:    Food Insecurity:     Worried About Running Out of Food in the Last Year:     Ran Out of Food in the Last Year:    Transportation Needs:     Lack of Transportation (Medical):      Lack of Transportation (Non-Medical):    Physical Activity:     Days of Exercise per Week:     Minutes of Exercise per Session:    Stress:     Feeling of Stress :    Social Connections:     Frequency of Communication with Friends and Family:     Frequency of Social Gatherings with Friends and Family:     Attends Taoism Services:     Active Member of Clubs or Organizations:  Attends Club or Organization Meetings:     Marital Status:    Intimate Partner Violence:     Fear of Current or Ex-Partner:     Emotionally Abused:     Physically Abused:     Sexually Abused: ALLERGIES: Crab    Review of Systems   Constitutional: Negative for fever. HENT: Negative for rhinorrhea. Respiratory: Negative for cough. Cardiovascular: Negative for chest pain. Gastrointestinal: Negative for abdominal pain. Genitourinary: Negative for dysuria. Musculoskeletal: Negative for back pain. Skin: Positive for wound. Neurological: Negative for headaches. Psychiatric/Behavioral: Negative for confusion. There were no vitals filed for this visit. Physical Exam  Vitals and nursing note reviewed. Constitutional:       General: He is not in acute distress. Appearance: Normal appearance. He is not ill-appearing, toxic-appearing or diaphoretic. HENT:      Head: Normocephalic. Eyes:      Extraocular Movements: Extraocular movements intact. Cardiovascular:      Rate and Rhythm: Normal rate. Pulses: Normal pulses. Heart sounds: Normal heart sounds. Pulmonary:      Effort: Pulmonary effort is normal. No respiratory distress. Breath sounds: Normal breath sounds. Abdominal:      General: There is no distension. Musculoskeletal:         General: Normal range of motion. Cervical back: Normal range of motion. Comments: Patient is able to flex and extend all of the fingers of the left hand against resistance. Skin:     General: Skin is warm and dry. Capillary Refill: Capillary refill takes less than 2 seconds. Comments: 1 cm laceration to the left wrist overlying the radial artery. Neurological:      Mental Status: He is alert and oriented to person, place, and time.    Psychiatric:         Mood and Affect: Mood normal.          MDM  Number of Diagnoses or Management Options  Diagnosis management comments: Patient presents with a laceration of the left wrist.  The area was anesthetized with lidocaine with epinephrine and probed. There does not appear to be any tendon injury. Patient is able to flex and extend all the fingers of the left hand against resistance. There is no arterial bleeding. A CTA of the left upper extremity will be obtained to ensure no vascular injury. Wound was repaired per procedure note. Patient's tetanus is up-to-date. Will refer to hand surgery for close follow-up. 7 PM  Change of shift. Care of patient signed over to Dr. Rhona Gutierrez. Bedside handoff complete. Awaiting CTA and dispo. Amount and/or Complexity of Data Reviewed  Clinical lab tests: ordered and reviewed  Tests in the radiology section of CPT®: ordered and reviewed           Wound Repair    Date/Time: 10/9/2021 6:43 PM  Performed by: attendingLocation details: left wrist  Wound length:2.5 cm or less  Anesthesia: local infiltration    Anesthesia:  Local Anesthetic: lidocaine 1% with epinephrine  Foreign bodies: no foreign bodies  Irrigation solution: saline  Irrigation method: jet lavage  Debridement: none  Skin closure: 4-0 nylon  Number of sutures: 2  Technique: simple  Approximation: close  Dressing: antibiotic ointment  Patient tolerance: patient tolerated the procedure well with no immediate complications  My total time at bedside, performing this procedure was 1-15 minutes.

## 2021-10-09 NOTE — ED TRIAGE NOTES
Pt presents to ED with volar surface of left wrist. Pt cut wrist with pocket knife about 4 hours PTA cutting a strap. NV intact. Exam per Dr Mehrdad Garner.

## 2021-10-10 NOTE — ED NOTES
7:05 PM  Change of shift. Care of patient taken over from Dr. Deonte Samuel; H&P reviewed, handoff complete. Awaiting CTA. Beatriz Brady MD    Progress Note:  Results, treatment, and follow up plan have been discussed with patient/wife. Questions were answered.    Beatriz Brady MD  8:58 PM

## 2022-03-11 ENCOUNTER — OFFICE VISIT (OUTPATIENT)
Dept: PRIMARY CARE CLINIC | Age: 42
End: 2022-03-11
Payer: COMMERCIAL

## 2022-03-11 VITALS
HEIGHT: 72 IN | SYSTOLIC BLOOD PRESSURE: 148 MMHG | BODY MASS INDEX: 33.21 KG/M2 | WEIGHT: 245.2 LBS | DIASTOLIC BLOOD PRESSURE: 94 MMHG | HEART RATE: 74 BPM | OXYGEN SATURATION: 95 % | TEMPERATURE: 97.2 F | RESPIRATION RATE: 16 BRPM

## 2022-03-11 DIAGNOSIS — M67.441 DIGITAL MUCINOUS CYST OF FINGER OF RIGHT HAND: Primary | ICD-10-CM

## 2022-03-11 DIAGNOSIS — D17.1 LIPOMA OF BACK: ICD-10-CM

## 2022-03-11 PROCEDURE — 99213 OFFICE O/P EST LOW 20 MIN: CPT | Performed by: NURSE PRACTITIONER

## 2022-03-11 RX ORDER — MELOXICAM 7.5 MG/1
7.5 TABLET ORAL DAILY
Qty: 10 TABLET | Refills: 0 | Status: SHIPPED | OUTPATIENT
Start: 2022-03-11

## 2022-03-11 NOTE — PROGRESS NOTES
[facility]   [address], [address] [facility]  P: [phone]  F: [phone]    SUBJECTIVE     HPI:     Jorge Luis Elkins II is a 46 y.o. male who is seen in the clinic for   Chief Complaint   Patient presents with    Cyst     started two weeks ago- painful - no itching- clear gel when pt open are on right middle finger-     Cyst     spine three years- no pain-       3 years ago, the patient noticed a growth to the left of his lumbar spine. He states that it is has not grown in size. Denies tenderness to palpation. 2 months ago, patient noticed a small cyst on the digital aspect of his 3rd right finger. He denies any lacerations/trauma to the area. He denies any particles entering the area. Tried to aspirate cyst with a needle 2 weeks ago and serous fluid exited. Cyst returned shortly thereafter. Patient's BP is elevated today in the clinic. He states that he has some Anxiety related to life events. Has a family history of HTN. Denies any s/s of hypertensive crisis. Would like to log BP at home and follow up in a month. At the next visit, we can discuss BP medications. There are no problems to display for this patient. History reviewed. No pertinent past medical history. Past Surgical History:   Procedure Laterality Date    HX ORTHOPAEDIC      HX ORTHOPAEDIC      2 on right knee will have one on the left knee soon.       Social History     Socioeconomic History    Marital status:      Spouse name: Not on file    Number of children: Not on file    Years of education: Not on file    Highest education level: Not on file   Occupational History    Not on file   Tobacco Use    Smoking status: Never Smoker    Smokeless tobacco: Never Used   Vaping Use    Vaping Use: Never used   Substance and Sexual Activity    Alcohol use: Yes     Comment: occasional    Drug use: Never    Sexual activity: Yes     Partners: Female   Other Topics Concern    Not on file Social History Narrative    ** Merged History Encounter **          Social Determinants of Health     Financial Resource Strain:     Difficulty of Paying Living Expenses: Not on file   Food Insecurity:     Worried About Running Out of Food in the Last Year: Not on file    Yolande of Food in the Last Year: Not on file   Transportation Needs:     Lack of Transportation (Medical): Not on file    Lack of Transportation (Non-Medical): Not on file   Physical Activity:     Days of Exercise per Week: Not on file    Minutes of Exercise per Session: Not on file   Stress:     Feeling of Stress : Not on file   Social Connections:     Frequency of Communication with Friends and Family: Not on file    Frequency of Social Gatherings with Friends and Family: Not on file    Attends Jainism Services: Not on file    Active Member of [address] [place] [address] [employer] or Organizations: Not on file    Attends Club or Organization Meetings: Not on file    Marital Status: Not on file   Intimate Partner Violence:     Fear of Current or Ex-Partner: Not on file    Emotionally Abused: Not on file    Physically Abused: Not on file    Sexually Abused: Not on file   Housing Stability:     Unable to Pay for Housing in the Last Year: Not on file    Number of [place] in the Last Year: Not on file    Unstable Housing in the Last Year: Not on file     Family History   Problem Relation Age of Onset    No Known Problems Mother     No Known Problems Father      Immunization History   Administered Date(s) Administered    Hepatitis A Vaccine 02/01/2002, 04/12/2003    Hepatitis B Vaccine 04/12/2003, 06/19/2003, 03/14/2004    IPV 02/01/2002    Meningococcal Vaccine 02/01/2002    Smallpox Vaccine 11/19/2004    TDAP Vaccine 07/22/2009    Tdap 01/01/2012    Typhoid Vaccine 04/12/2003, 03/24/2005      Allergies   Allergen Reactions    Crab Hives     BLUE CRAB        No visits with results within 3 Month(s) from this visit.    Latest known visit with results is:   Admission on 10/09/2021, Discharged on 10/09/2021   Component Date Value Ref Range Status    Calcium, ionized (POC) 10/09/2021 1.17  1.12 - 1.32 mmol/L Final    BICARBONATE 10/09/2021 27  mmol/L Final    Sample source 10/09/2021 VENOUS BLOOD    Final    Sodium, POC 10/09/2021 142  136 - 145 MMOL/L Final    Potassium, POC 10/09/2021 3.5  3.5 - 5.5 MMOL/L Final    Chloride, POC 10/09/2021 103  100 - 108 MMOL/L Final    Glucose, POC 10/09/2021 102  74 - 106 MG/DL Final    Creatinine, POC 10/09/2021 1.2  0.6 - 1.3 MG/DL Final    pH, venous (POC) 10/09/2021 7.41  7.32 - 7.42   Final    pCO2, venous (POC) 10/09/2021 43.0  41 - 51 MMHG Final    pO2, venous (POC) 10/09/2021 37  25 - 40 mmHg Final      CT UP EXT LT W CONT  Narrative: PRELIMINARY REPORT    No evidence of radial or ulnar artery injury. Small laceration over the volar  aspect of the wrist. No significant wrist hematoma. Preliminary report was provided by Dr. Stuart Overton, the on-call radiologist, at 8:47  PM.    Final report to follow. END PRELIMINARY REPORT    INDICATION:  Concern for radial artery injury. Laceration today    EXAM: CT arteriogram left upper extremity. No comparisons. Thin section axial images were obtained. Images obtained after the intravenous  administration of 100 cc of Isovue-370. From these sagittal and coronal  reformats were performed. CT dose reduction was achieved through use of a  standardized protocol tailored for this examination and automatic exposure  control for dose modulation. 3-D reconstructions were performed by the  technologist    FINDINGS: There is no evidence of dissection or vessel occlusion of the radial  or ulnar arteries at the level of the laceration at the volar aspect of the  wrist. There is gas within the soft tissues but no foreign body. A  full-thickness tear of the tendons is not demonstrated given limitations of CT. No acute fracture. No joint effusion. Impression: 1.  Laceration with gas within the soft tissues at the level of the volar wrist.  No evidence of arterial injury     Current Outpatient Medications   Medication Sig Dispense Refill    meloxicam (MOBIC) 7.5 mg tablet Take 1 Tablet by mouth daily. 10 Tablet 0    sildenafil citrate (VIAGRA) 50 mg tablet Take 1 tab as needed 1 hr prior to sex. (Patient not taking: Reported on 3/11/2022) 15 Tab 0    diphenhydrAMINE (BENADRYL) 25 mg capsule Take 1 Cap by mouth nightly as needed. (Patient not taking: Reported on 10/9/2021) 10 Cap 0    famotidine (PEPCID) 20 mg tablet Take 1 Tab by mouth two (2) times a day. (Patient not taking: Reported on 3/11/2022) 20 Tab 0    predniSONE (STERAPRED) 5 mg dose pack Take as directed on package. (Patient not taking: Reported on 3/11/2022) 48 Tab 0    tapentadol (NUCYNTA) 50 mg tablet Take 50 mg by mouth every six (6) hours as needed for Pain. Max Daily Amount: 200 mg. (Patient not taking: Reported on 3/11/2022) 15 Tab 0    propoxyphene napsylate-acetaminophen (DARVOCET-N 100) 100-650 mg per tablet Take 1 Tab by mouth every six (6) hours as needed for Pain. (Patient not taking: Reported on 3/11/2022) 20 Tab 0           The past medical history, past surgical history, and family history were reviewed and updated in the medical record. Lab values/Imaging were reviewed. The medications were reviewed and updated in the medical record. Immunizations were reviewed and updated in the medical record. All relevant preventative screenings reviewed and updated in the medical record. REVIEW OF SYSTEMS   Review of Systems   Constitutional: Negative for chills, diaphoresis, fever and malaise/fatigue. Eyes: Negative for blurred vision, double vision and photophobia. Respiratory: Negative for shortness of breath and wheezing. Cardiovascular: Negative for chest pain and palpitations. Neurological: Negative for dizziness, sensory change, speech change, weakness and headaches.          PHYSICAL EXAM BP (!) 148/94 (BP 1 Location: Left upper arm, BP Patient Position: Sitting, BP Cuff Size: Adult long)   Pulse 74   Temp 97.2 °F (36.2 °C) (Temporal)   Resp 16   Ht 6' (1.829 m)   Wt 245 lb 3.2 oz (111.2 kg)   SpO2 95%   BMI 33.26 kg/m²      Physical Exam  Eyes:      Pupils: Pupils are equal, round, and reactive to light. Cardiovascular:      Rate and Rhythm: Normal rate and regular rhythm. Pulses: Normal pulses. Heart sounds: Normal heart sounds. No murmur heard. Pulmonary:      Effort: Pulmonary effort is normal. No respiratory distress. Breath sounds: Normal breath sounds. No wheezing. Skin:     Capillary Refill: Capillary refill takes less than 2 seconds. Comments: Lipoma on left side of lumbar aspect of spine. Cyst on Distal aspect of 3rd finger of right hand. Neurological:      General: No focal deficit present. Mental Status: He is oriented to person, place, and time. Cranial Nerves: No cranial nerve deficit. ASSESSMENT/ PLAN   Below is the assessment and plan developed based on review of pertinent history, physical exam, labs, studies, and medications. 1. Digital mucinous cyst of finger of right hand  Discussed with patient that cyst will need to be I&D. [facility]. Cyst is likely not infectious in nature. Education provided about risks/benefits of taking Mobic s/p I&D for inflammation/pain. Please take medication with food. -     meloxicam (MOBIC) 7.5 mg tablet; Take 1 Tablet by mouth daily. , Normal, Disp-10 Tablet, R-0  2. Lipoma of back  -      ABD LTD; Future       Follow-up and Dispositions    · Return in about 4 weeks (around 4/8/2022) for Follow up with BP and Annual physical exam.         Disclaimer:  Advised patient to call back or return to office if symptoms worsen/change/persist.  Discussed expected course/resolution/complications of diagnosis in detail with patient.      Medication risks/benefits/alternatives discussed with patient. Patient was given an after visit summary which includes diagnoses, current medications, & vitals. Discussed patient instructions and advised to read to all patient instructions regarding care. Patient expressed understanding with the diagnosis and plan.        Capri Alonso NP  3/11/2022

## 2022-03-11 NOTE — PROGRESS NOTES
Chief Complaint   Patient presents with    Cyst     started two weeks ago- painful - no itching- clear gel when pt open are on right middle finger-     Cyst     spine three years- no pain-        Health Maintenance Due   Topic    Hepatitis C Screening     COVID-19 Vaccine (1)    Flu Vaccine (1)    DTaP/Tdap/Td series (3 - Td or Tdap)        1. Have you been to the ER, urgent care clinic since your last visit? Hospitalized since your last visit? No    2. Have you seen or consulted any other health care providers outside of the [address] [place] [address] since your last visit? Include any pap smears or colon screening.  No    Visit Vitals  BP (!) 148/94 (BP 1 Location: Left upper arm, BP Patient Position: Sitting, BP Cuff Size: Adult long)   Pulse 74   Temp 97.2 °F (36.2 °C) (Temporal)   Resp 16   Ht 6' (1.829 m)   Wt 245 lb 3.2 oz (111.2 kg)   SpO2 95%   BMI 33.26 kg/m²

## 2022-03-16 ENCOUNTER — HOSPITAL ENCOUNTER (OUTPATIENT)
Dept: ULTRASOUND IMAGING | Age: 42
Discharge: HOME | End: 2022-03-16
Attending: NURSE PRACTITIONER
Payer: COMMERCIAL

## 2022-03-16 ENCOUNTER — TELEPHONE (OUTPATIENT)
Dept: PRIMARY CARE CLINIC | Age: 42
End: 2022-03-16

## 2022-03-16 DIAGNOSIS — D17.1 LIPOMA OF BACK: ICD-10-CM

## 2022-03-16 PROCEDURE — 76705 ECHO EXAM OF ABDOMEN: CPT

## 2022-03-16 NOTE — PROGRESS NOTES
Your ultrasound results show a benign lipoma (Not cancerous) in your back. If it bothers you in the future, we can remove it, but until then we can just monitor it. No need to worry.      Did urgent care drain your cyst?

## 2022-03-16 NOTE — TELEPHONE ENCOUNTER
----- Message from Quinn Allen NP sent at 3/16/2022  4:50 PM EDT -----  Your ultrasound results show a benign lipoma (Not cancerous) in your back. If it bothers you in the future, we can remove it, but until then we can just monitor it. No need to worry.      Did urgent care drain your cyst?

## 2022-03-17 ENCOUNTER — TELEPHONE (OUTPATIENT)
Dept: PRIMARY CARE CLINIC | Age: 42
End: 2022-03-17

## 2022-03-17 NOTE — TELEPHONE ENCOUNTER
Pt aware of xray results pt [facility] that [facility] and it was clear gel like fluid and nothing was in it. Pt reports that [facility]  [facility] said that the liquid could have come from joints and if the cyst return he should see a hand doctor.

## 2023-02-23 ENCOUNTER — OFFICE VISIT (OUTPATIENT)
Dept: PRIMARY CARE CLINIC | Age: 43
End: 2023-02-23
Payer: COMMERCIAL

## 2023-02-23 VITALS
HEIGHT: 72 IN | TEMPERATURE: 97.1 F | RESPIRATION RATE: 12 BRPM | DIASTOLIC BLOOD PRESSURE: 84 MMHG | HEART RATE: 75 BPM | WEIGHT: 248 LBS | BODY MASS INDEX: 33.59 KG/M2 | OXYGEN SATURATION: 97 % | SYSTOLIC BLOOD PRESSURE: 125 MMHG

## 2023-02-23 DIAGNOSIS — E78.5 HYPERLIPIDEMIA, UNSPECIFIED HYPERLIPIDEMIA TYPE: ICD-10-CM

## 2023-02-23 DIAGNOSIS — E66.09 CLASS 1 OBESITY DUE TO EXCESS CALORIES WITH SERIOUS COMORBIDITY AND BODY MASS INDEX (BMI) OF 33.0 TO 33.9 IN ADULT: ICD-10-CM

## 2023-02-23 DIAGNOSIS — R53.83 FATIGUE, UNSPECIFIED TYPE: ICD-10-CM

## 2023-02-23 DIAGNOSIS — Z11.59 ENCOUNTER FOR HEPATITIS C SCREENING TEST FOR LOW RISK PATIENT: ICD-10-CM

## 2023-02-23 DIAGNOSIS — K21.9 GASTROESOPHAGEAL REFLUX DISEASE WITHOUT ESOPHAGITIS: ICD-10-CM

## 2023-02-23 DIAGNOSIS — Z00.00 ANNUAL PHYSICAL EXAM: ICD-10-CM

## 2023-02-23 DIAGNOSIS — R73.02 IMPAIRED GLUCOSE TOLERANCE (ORAL): ICD-10-CM

## 2023-02-23 DIAGNOSIS — J42 CHRONIC BRONCHITIS, UNSPECIFIED CHRONIC BRONCHITIS TYPE (HCC): Primary | ICD-10-CM

## 2023-02-23 DIAGNOSIS — H93.13 TINNITUS OF BOTH EARS: ICD-10-CM

## 2023-02-23 DIAGNOSIS — R06.09 DOE (DYSPNEA ON EXERTION): ICD-10-CM

## 2023-02-23 DIAGNOSIS — R06.83 SNORING: ICD-10-CM

## 2023-02-23 DIAGNOSIS — N52.9 ERECTILE DYSFUNCTION, UNSPECIFIED ERECTILE DYSFUNCTION TYPE: ICD-10-CM

## 2023-02-23 PROCEDURE — 99214 OFFICE O/P EST MOD 30 MIN: CPT | Performed by: NURSE PRACTITIONER

## 2023-02-23 RX ORDER — SUCRALFATE 1 G/1
1 TABLET ORAL
Qty: 30 TABLET | Refills: 0 | Status: SHIPPED | OUTPATIENT
Start: 2023-02-23

## 2023-02-23 RX ORDER — ALBUTEROL SULFATE 90 UG/1
1 AEROSOL, METERED RESPIRATORY (INHALATION)
Qty: 18 G | Refills: 0 | Status: SHIPPED | OUTPATIENT
Start: 2023-02-23

## 2023-02-23 RX ORDER — PANTOPRAZOLE SODIUM 20 MG/1
20 TABLET, DELAYED RELEASE ORAL DAILY
Qty: 30 TABLET | Refills: 1 | Status: SHIPPED | OUTPATIENT
Start: 2023-02-23

## 2023-02-23 NOTE — PROGRESS NOTES
[facility]   [address] 67[address], [address] [facility]  P: [phone]  F: [phone]    SUBJECTIVE     HPI:     Mary Trevino II is a 38 y.o. male who is seen in the clinic for   Chief Complaint   Patient presents with    Physical      The patient presents today for his annual physical exam.     Struggles with chronic fatigue. Believes that it may be due to Male Hypogonadism and/or DANG. Wife states that the patient snores and goes apneic at night. In the process of getting an appointment with Sleep Medicine. Has chronic tinnitus in bilateral Ears. Would like to do additional testing to determine the cause and different treatment regimens that are available. Denies any trauma. GERD symptoms have worsened. Takes OTC Pepcid prn. ED symptoms have worsened. Has tried prn Viagra, but experienced s/e from it. Would like to see if Testosterone levels may be the cause. Hx of Chronic Bronchitis. Would like additional testing to determine the severity of his symptoms. Has gained 18 lbs since 2021. Diet is well rounded. Exercises daily. LDL was 128 in 2019. Of note, he is an [address] and is having difficulty getting procedures approved through the [place]. He was in several war zones during his time at the [place]. There are no problems to display for this patient. History reviewed. No pertinent past medical history. Past Surgical History:   Procedure Laterality Date    HX ORTHOPAEDIC      HX ORTHOPAEDIC      2 on right knee will have one on the left knee soon.       Social History     Socioeconomic History    Marital status:      Spouse name: Not on file    Number of children: Not on file    Years of education: Not on file    Highest education level: Not on file   Occupational History    Not on file   Tobacco Use    Smoking status: Never    Smokeless tobacco: Never   Vaping Use    Vaping Use: Never used   Substance and Sexual Activity    Alcohol use: Yes     Comment: occasional    Drug use: Never    Sexual activity: Yes     Partners: Female   Other Topics Concern    Not on file   Social History Narrative    ** Merged History Encounter **          Social Determinants of Health     Financial Resource Strain: Not on file   Food Insecurity: Not on file   Transportation Needs: Not on file   Physical Activity: Not on file   Stress: Not on file   Social Connections: Not on file   Intimate Partner Violence: Not on file   Housing Stability: Not on file     Family History   Problem Relation Age of Onset    No Known Problems Mother     No Known Problems Father      Immunization History   Administered Date(s) Administered    Hepatitis A Vaccine 02/01/2002, 04/12/2003    Hepatitis B Vaccine 04/12/2003, 06/19/2003, 03/14/2004    IPV 02/01/2002    Meningococcal Vaccine 02/01/2002    Smallpox Vaccine 11/19/2004    TDAP Vaccine 07/22/2009    Tdap 01/01/2012    Typhoid Vaccine 04/12/2003, 03/24/2005      Allergies   Allergen Reactions    Crab Hives     BLUE CRAB        No visits with results within 3 Month(s) from this visit. Latest known visit with results is:   Admission on 10/09/2021, Discharged on 10/09/2021   Component Date Value Ref Range Status    Calcium, ionized (POC) 10/09/2021 1.17  1.12 - 1.32 mmol/L Final    BICARBONATE 10/09/2021 27  mmol/L Final    Sample source 10/09/2021 VENOUS BLOOD    Final    Sodium, POC 10/09/2021 142  136 - 145 MMOL/L Final    Potassium, POC 10/09/2021 3.5  3.5 - 5.5 MMOL/L Final    Chloride, POC 10/09/2021 103  100 - 108 MMOL/L Final    Glucose, POC 10/09/2021 102  74 - 106 MG/DL Final    Creatinine, POC 10/09/2021 1.2  0.6 - 1.3 MG/DL Final    pH, venous (POC) 10/09/2021 7.41  7.32 - 7.42   Final    pCO2, venous (POC) 10/09/2021 43.0  41 - 51 MMHG Final    pO2, venous (POC) 10/09/2021 37  25 - 40 mmHg Final      US ABD LTD  Narrative: Ultrasound of the soft tissues. HISTORY: Mass along the left lower back for 3 years.     Targeted ultrasound was performed of the area of interest. Both static and cine  images are submitted. Corresponding to the palpable finding is a discrete homogeneous 2.7 x 4.3 x 1.2  cm structure that is isoechoic to fat. This is located within the subcutaneous  layer and is reported to be pliable. It is located within a few millimeters of  the skin surface. 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 No ultrasound  characteristics for malignancy at this time. Current Outpatient Medications   Medication Sig Dispense Refill    pantoprazole (PROTONIX) 20 mg tablet Take 1 Tablet by mouth daily. 30 Tablet 1    sucralfate (Carafate) 1 gram tablet Take 1 Tablet by mouth four (4) times daily as needed for Nausea (gerd). 30 Tablet 0    albuterol (PROVENTIL HFA, VENTOLIN HFA, PROAIR HFA) 90 mcg/actuation inhaler Take 1 Puff by inhalation every four (4) hours as needed for Wheezing, Shortness of Breath or Respiratory Distress. 18 g 0           The past medical history, past surgical history, and family history were reviewed and updated in the medical record. Lab values/Imaging were reviewed. The medications were reviewed and updated in the medical record. Immunizations were reviewed and updated in the medical record. All relevant preventative screenings reviewed and updated in the medical record. REVIEW OF SYSTEMS   Review of Systems   Constitutional:  Positive for malaise/fatigue. Negative for chills, diaphoresis, fever and weight loss. HENT:  Negative for congestion, ear discharge, ear pain, hearing loss, nosebleeds, sinus pain, sore throat and tinnitus. Eyes:  Negative for blurred vision and double vision. Respiratory:  Positive for shortness of breath. Negative for cough, hemoptysis, sputum production, wheezing and stridor. Cardiovascular:  Negative for chest pain, palpitations, orthopnea, claudication, leg swelling and PND. Gastrointestinal:  Positive for heartburn. Negative for abdominal pain, blood in stool, constipation, diarrhea, melena, nausea and vomiting. Genitourinary:  Negative for dysuria, flank pain, frequency, hematuria and urgency. Musculoskeletal:  Negative for back pain, falls, joint pain, myalgias and neck pain. Neurological:  Positive for weakness. Negative for dizziness, tingling, tremors, sensory change, speech change, focal weakness, seizures, loss of consciousness and headaches. Endo/Heme/Allergies:  Negative for environmental allergies. Psychiatric/Behavioral:  Negative for depression, hallucinations, memory loss, substance abuse and suicidal ideas. The patient is not nervous/anxious and does not have insomnia. PHYSICAL EXAM   /84 (BP 1 Location: Right arm, BP Patient Position: Sitting, BP Cuff Size: Adult long)   Pulse 75   Temp 97.1 °F (36.2 °C) (Temporal)   Resp 12   Ht 6' (1.829 m)   Wt 248 lb (112.5 kg)   SpO2 97%   BMI 33.63 kg/m²      Physical Exam  Vitals and nursing note reviewed. Constitutional:       General: He is not in acute distress. Appearance: Normal appearance. He is well-developed. He is not diaphoretic. HENT:      Head: Normocephalic and atraumatic. Right Ear: Tympanic membrane, ear canal and external ear normal.      Left Ear: Tympanic membrane, ear canal and external ear normal.      Mouth/Throat:      Mouth: Mucous membranes are moist.      Pharynx: Oropharynx is clear. Eyes:      General: No scleral icterus. Right eye: No discharge. Left eye: No discharge. Extraocular Movements: Extraocular movements intact. Conjunctiva/sclera: Conjunctivae normal.   Neck:      Thyroid: No thyromegaly. Cardiovascular:      Rate and Rhythm: Normal rate and regular rhythm. Pulses: Normal pulses. Dorsalis pedis pulses are 2+ on the right side and 2+ on the left side. Heart sounds: Normal heart sounds. No murmur heard.   Pulmonary:      Effort: Pulmonary effort is normal. No respiratory distress. Breath sounds: Normal breath sounds. No wheezing. Chest:      Chest wall: No tenderness. Abdominal:      General: Bowel sounds are normal. There is no distension. Palpations: Abdomen is soft. There is no mass. Tenderness: There is no abdominal tenderness. Musculoskeletal:      Cervical back: Normal range of motion and neck supple. No tenderness. Right lower leg: No edema. Left lower leg: No edema. Comments: B/L knees without crepitus   Lymphadenopathy:      Cervical: No cervical adenopathy. Skin:     General: Skin is warm and dry. Capillary Refill: Capillary refill takes less than 2 seconds. Neurological:      General: No focal deficit present. Cranial Nerves: No cranial nerve deficit. Motor: No weakness. Deep Tendon Reflexes:      Reflex Scores:       Patellar reflexes are 2+ on the right side and 2+ on the left side. Psychiatric:         Mood and Affect: Mood normal.          ASSESSMENT/ PLAN   Below is the assessment and plan developed based on review of pertinent history, physical exam, labs, studies, and medications. 1. Chronic bronchitis, unspecified chronic bronchitis type (HCC)  -     REFERRAL TO PULMONARY DISEASE  -     albuterol (PROVENTIL HFA, VENTOLIN HFA, PROAIR HFA) 90 mcg/actuation inhaler; Take 1 Puff by inhalation every four (4) hours as needed for Wheezing, Shortness of Breath or Respiratory Distress., Normal, Disp-18 g, R-0  2. Annual physical exam  -     METABOLIC PANEL, COMPREHENSIVE; Future  -     CBC WITH AUTOMATED DIFF; Future  -     LIPID PANEL; Future  -     THYROID CASCADE PROFILE; Future  -     HEMOGLOBIN A1C WITH EAG; Future  3. Hyperlipidemia, unspecified hyperlipidemia type  -     METABOLIC PANEL, COMPREHENSIVE; Future  -     LIPID PANEL; Future  4. Gastroesophageal reflux disease without esophagitis  -     REFERRAL TO GASTROENTEROLOGY  -     pantoprazole (PROTONIX) 20 mg tablet;  Take 1 Tablet by mouth daily. , Normal, Disp-30 Tablet, R-1Appointment needed for future refills. -     sucralfate (Carafate) 1 gram tablet; Take 1 Tablet by mouth four (4) times daily as needed for Nausea (gerd). , Normal, Disp-30 Tablet, R-0Appointment needed for future refills. 5. Erectile dysfunction, unspecified erectile dysfunction type  -     TESTOSTERONE, FREE & TOTAL; Future  -     PSA W/ REFLX FREE PSA; Future  6. Class 1 obesity due to excess calories with serious comorbidity and body mass index (BMI) of 33.0 to 33.9 in adult  -     SLEEP MEDICINE REFERRAL  -     LIPID PANEL; Future  -     THYROID CASCADE PROFILE; Future  -     HEMOGLOBIN A1C WITH EAG; Future  7. Fatigue, unspecified type  -     SLEEP MEDICINE REFERRAL  -     CBC WITH AUTOMATED DIFF; Future  -     VITAMIN B12; Future  -     IRON PROFILE; Future  -     THYROID CASCADE PROFILE; Future  -     TESTOSTERONE, FREE & TOTAL; Future  -     REFERRAL TO PULMONARY DISEASE  8. Tinnitus of both ears  -     REFERRAL TO ENT-OTOLARYNGOLOGY  9. Snoring  -     SLEEP MEDICINE REFERRAL  -     REFERRAL TO PULMONARY DISEASE  10. ANGUIANO (dyspnea on exertion)  -     CBC WITH AUTOMATED DIFF; Future  -     VITAMIN B12; Future  -     IRON PROFILE; Future  -     REFERRAL TO PULMONARY DISEASE  -     albuterol (PROVENTIL HFA, VENTOLIN HFA, PROAIR HFA) 90 mcg/actuation inhaler; Take 1 Puff by inhalation every four (4) hours as needed for Wheezing, Shortness of Breath or Respiratory Distress., Normal, Disp-18 g, R-0  11. Impaired glucose tolerance (oral)  -     METABOLIC PANEL, COMPREHENSIVE; Future  -     HEMOGLOBIN A1C WITH EAG; Future  12. Encounter for hepatitis C screening test for low risk patient  -     HEPATITIS C AB; Future     Pending results of Testosterone Testing, he is agreeable to starting Androgel. If medication is needed, he will need to make a separate appointment to prescribe the med.      Follow-up and Dispositions    Return in about 4 weeks (around 3/23/2023) for Re-assess gerd w/ the addition of Protonix and prn Carafate . Disclaimer:  Advised patient to call back or return to office if symptoms worsen/change/persist.  Discussed expected course/resolution/complications of diagnosis in detail with patient. Medication risks/benefits/alternatives discussed with patient. Patient was given an after visit summary which includes diagnoses, current medications, & vitals. Discussed patient instructions and advised to read to all patient instructions regarding care. Patient expressed understanding with the diagnosis and plan.        Oskar Monson NP  2/23/2023

## 2023-02-23 NOTE — PROGRESS NOTES
Chief Complaint   Patient presents with    Physical       Health Maintenance Due   Topic    Hepatitis C Screening     COVID-19 Vaccine (1)    DTaP/Tdap/Td series (3 - Td or Tdap)    Flu Vaccine (1)        1. \"Have you been to the ER, urgent care clinic since your last visit? Hospitalized since your last visit? \" No    2. \"Have you seen or consulted any other health care providers outside of the [address] [place] [address] since your last visit? \" No     3. For patients aged 39-70: Has the patient had a colonoscopy / FIT/ Cologuard? NA - based on age      If the patient is female:    4. For patients aged 41-77: Has the patient had a mammogram within the past 2 years? NA - based on age or sex      11. For patients aged 21-65: Has the patient had a pap smear?  NA - based on age or sex     Visit Vitals  Temp 97.1 °F (36.2 °C) (Temporal)   Wt 248 lb (112.5 kg)   BMI 33.63 kg/m²

## 2023-03-17 ENCOUNTER — OFFICE VISIT (OUTPATIENT)
Dept: PRIMARY CARE CLINIC | Age: 43
End: 2023-03-17

## 2023-03-17 VITALS
SYSTOLIC BLOOD PRESSURE: 136 MMHG | HEART RATE: 66 BPM | TEMPERATURE: 97.1 F | RESPIRATION RATE: 12 BRPM | OXYGEN SATURATION: 97 % | DIASTOLIC BLOOD PRESSURE: 84 MMHG | WEIGHT: 240 LBS | BODY MASS INDEX: 32.51 KG/M2 | HEIGHT: 72 IN

## 2023-03-17 DIAGNOSIS — R53.82 CHRONIC FATIGUE: ICD-10-CM

## 2023-03-17 DIAGNOSIS — E29.1 MALE HYPOGONADISM: ICD-10-CM

## 2023-03-17 DIAGNOSIS — K21.9 GASTROESOPHAGEAL REFLUX DISEASE WITHOUT ESOPHAGITIS: ICD-10-CM

## 2023-03-17 DIAGNOSIS — R79.89 ELEVATED SERUM CREATININE: ICD-10-CM

## 2023-03-17 DIAGNOSIS — J42 CHRONIC BRONCHITIS, UNSPECIFIED CHRONIC BRONCHITIS TYPE (HCC): Primary | ICD-10-CM

## 2023-03-17 DIAGNOSIS — H93.13 TINNITUS OF BOTH EARS: ICD-10-CM

## 2023-03-17 DIAGNOSIS — M67.449 DIGITAL MUCINOUS CYST OF FINGER: ICD-10-CM

## 2023-03-17 DIAGNOSIS — E78.2 MIXED HYPERLIPIDEMIA: ICD-10-CM

## 2023-03-17 DIAGNOSIS — Z79.899 MEDICATION MANAGEMENT: ICD-10-CM

## 2023-03-17 DIAGNOSIS — N52.9 ERECTILE DYSFUNCTION, UNSPECIFIED ERECTILE DYSFUNCTION TYPE: ICD-10-CM

## 2023-03-17 DIAGNOSIS — J45.40 MODERATE PERSISTENT ASTHMA WITHOUT COMPLICATION: ICD-10-CM

## 2023-03-17 DIAGNOSIS — Z79.899 HIGH RISK MEDICATION USE: ICD-10-CM

## 2023-03-17 RX ORDER — TESTOSTERONE 20.25 MG/1.25G
40.5 GEL TOPICAL DAILY
Qty: 1 EACH | Refills: 1 | Status: SHIPPED | OUTPATIENT
Start: 2023-03-17 | End: 2023-03-17

## 2023-03-17 RX ORDER — TESTOSTERONE 20.25 MG/1.25G
40.5 GEL TOPICAL DAILY
Qty: 1 EACH | Refills: 1 | Status: SHIPPED | OUTPATIENT
Start: 2023-03-17

## 2023-03-17 NOTE — LETTER
3/17/2023 10:32 AM    Mr. Alin Mejias [address] [facility] [id number]    CONTROLLED SUBSTANCE MEDICATION AGREEMENT     Patient Name: Alin Funes II  Patient YOB: 1980     I understand, that controlled substance medications may be used to help better manage my symptoms and to improve my ability to function at home, work and in social settings. However, I also understand that these medications do have risks, which have been discussed with me, including possible development of physical or psychological dependence. I understand that successful treatment requires mutual trust and honesty between me and my provider. I understand and agree that following this Medication Agreement is necessary in continuing my provider-patient relationship and the success of my treatment plan. Explanation from my Provider: Benefits and Goals of Controlled Substance Medications: There are two potential goals for your treatment: (1) decreased pain and suffering (2) improved daily life functions. There are many possible treatments for your chronic condition(s). Alternatives such as physical therapy, yoga, massage, home daily exercise, meditation, relaxation techniques, injections, chiropractic manipulations, surgery, cognitive therapy, hypnosis and many medications that are not habit-forming may be used. Use of controlled substance medications may be helpful, but they are unlikely to resolve all symptoms or restore all function. Explanation from my Provider: Risks of Controlled Substance Medications:  Opioid pain medications: These medications can lead to problems such as addiction/dependence, sedation, lightheadedness/dizziness, memory issues, falls, constipation, nausea, or vomiting. They may also impair the ability to drive or operate machinery. Additionally, these medications may lower testosterone levels, leading to loss of bone strength, stamina and sex drive.   They may cause problems with breathing, sleep apnea and reduced coughing, which is especially dangerous for patients with lung disease. Overdose or dangerous interactions with alcohol and other medications may occur, leading to death. Hyperalgesia may develop, which means patients receiving opioids for the treatment of pain may become more sensitive to certain painful stimuli, and in some cases, experience pain from ordinarily non-painful stimuli. Women between the ages of 14-53 who could become pregnant should carefully weigh the risks and benefits of opioids with their physicians, as these medications increase the risk of pregnancy complications, including miscarriage,  delivery and stillbirth. It is also possible for babies to be born addicted to opioids. Opioid dependence withdrawal symptoms may include; feelings of uneasiness, increased pain, irritability, belly pain, diarrhea, sweats and goose-flesh. Benzodiazepines and non-benzodiazepine sleep medications: These medications can lead to problems such as addiction/dependence, sedation, fatigue, lightheadedness, dizziness, incoordination, falls, depression, hallucinations, and impaired judgment, memory and concentration. The ability to drive and operate machinery may also be affected. Abnormal sleep-related behaviors have been reported, including sleepwalking, driving, making telephone calls, eating, or having sex while not fully awake. These medications can suppress breathing and worsen sleep apnea, particularly when combined with alcohol or other sedating medications, potentially leading to death. Dependence withdrawal symptoms may include tremors, anxiety, hallucinations and seizures. Initials:_______  Stimulants:  Common adverse effects include addiction/dependence, increased blood  pressure and heart rate, decreased appetite, nausea, involuntary weight loss, insomnia,  irritability, and headaches.   These risks may increase when these medications are combined with other stimulants, such as caffeine pills or energy drinks, certain weight loss supplements and oral decongestants. Dependence withdrawal symptoms may include depressed mood, loss of interest, suicidal thoughts, anxiety, fatigue, appetite changes and agitation. Testosterone replacement therapy:  Potential side effects include increased risk of stroke and heart attack, blood clots, increased blood pressure, increased cholesterol, enlarged prostate, sleep apnea, irritability/aggression and other mood disorders, and decreased fertility. I agree and understand that I and my prescriber have the following rights and responsibilities regarding my treatment plan:     1. MY RIGHTS:  To be informed of my treatment and medication plan. To be an active participant in my health and wellbeing. 2. MY RESPONSIBILITY AND UNDERSTANDING FOR USE OF MEDICATIONS   I will take medications at the dose and frequency as directed. For my safety, I will not increase or change how I take my medications without the recommendation of my healthcare provider.  I will actively participate in any program recommended by my provider which may improve function, including social, physical, psychological programs.  I will not take my medications with alcohol or other drugs not prescribed to me. I understand that drinking alcohol with my medications increases the chances of side effects, including reduced breathing rate and could lead to personal injury when operating machinery.  I understand that if I have a history of substance use disorders, including alcohol or other illicit drugs, that I may be at increased risk of addiction to my medications.  I agree to notify my provider immediately if I should become pregnant so that my treatment plan can be adjusted.    I agree and understand that I shall only receive controlled substance medications from the prescriber that signed this agreement unless there is written agreement among other prescribers of controlled substances outlining the responsibility of the medications being prescribed.  I understand that the if the controlled medication is not helping to achieve goals, the dosage may be tapered and no longer prescribed. 3. MY RESPONSIBILITY FOR COMMUNICATION / PRESCRIPTION RENEWALS   I agree that all controlled substance medications that I take will be prescribed only by my provider. If another healthcare provider prescribes me medication in an emergency, I will notify my provider within seventy-two (72) hours.  I will arrange for refills at the prescribed interval ONLY during regular office hours. I will not ask for refills earlier than agreed, after-hours, on holidays or weekends. Refills may take up to 72 hours for processing and prescriptions to reach the pharmacy.  I will inform my other health care providers that I am taking these medications and of the existence of this [id number]. In the event of an emergency, I will provide the same information to the emergency department prescribers. Initials:_______  Ruthie Mcfarland I will keep my provider updated on the pharmacy I am using for controlled medication prescription filling. 4. MY RESPONSIBILITY FOR PROTECTING MEDICATIONS   I will protect my prescriptions and medications. I understand that lost or misplaced prescriptions will not be replaced.  I will keep medications only for my own use and will not share them with others. I will keep all medications away from children.  I agree that if my medications are adjusted or discontinued, I will properly dispose of any remaining medications. I understand that I will be required to dispose of any remaining controlled medications as, directed by my prescriber, prior to being provided with any prescriptions for other controlled medications.   Medication drop box locations can be found at: [email or web address]    5. MY RESPONSIBILITY WITH ILLEGAL DRUGS    I will not use illegal or street drugs or another person's prescription medications not prescribed to me.  If there are identified addiction type symptoms, then referral to a program may be provided by my provider and I agree to follow through with this recommendation. 6. MY RESPONSIBILITY FOR COOPERATION WITH INVESTIGATIONS   I understand that my provider will comply with any applicable law and may discuss my use and/or possible misuse/abuse of controlled substances and alcohol, as appropriate, with any health care provider involved in my care, pharmacist, or legal authority.  I authorize my provider and pharmacy to cooperate fully with law enforcement agencies (as permitted by law) in the investigation of any possible misuse, sale, or other diversion of my controlled substances.  I agree to waive any applicable privilege or right of privacy or confidentiality with respect to these authorizations. 7. PROVIDERS RIGHT TO MONITOR FOR SAFETY: PRESCRIPTION MONITORING / DRUG TESTING   I consent to drug/toxicology screening and will submit to a drug screen upon my providers request to assure I am only taking the prescribed drugs for my safety monitoring. I understand that a drug screen is a laboratory test in which a sample of my urine, blood or saliva is checked to see what drugs I have been taking. This may entail an observed urine specimen, which means that a nurse or other health care provider may watch me provide urine, and I will cooperate if I am asked to provide an observed specimen.  I understand that my provider will check a copy of my State Prescription Monitoring Program () Report in order to safely prescribe medications.  Pill Counts: I consent to pill counts when requested.   I may be asked to bring all my prescribed controlled substance medications, in their original bottles, to all of my scheduled appointments. In addition, my provider may ask me to come to the practice at any time for a random pill count. Initials:_______    8. TERMINATION OF THIS AGREEMENT  For my safety, my prescriber has the right to stop prescribing controlled substance medications and may end this agreement.  Conditions that may result in termination of this agreement:  a. I do not show any improvement in pain, or my activity has not improved. b. I develop rapid tolerance or loss of improvement, as described in my treatment plan.  c. I develop significant side effects from the medication. d. My behavior is not consistent with the responsibilities outlined above, thereby causing safety concerns to continue prescribing controlled substance medications. e. I fail to follow the terms of this agreement. f. Other:____________________________       UNDERSTANDING THIS MEDICATION AGREEMENT:    I have read the above and have had all my questions answered. For chronic disease management, I know that my symptoms can be managed with many types of treatments. A chronic medication trial may be part of my treatment, but I must be an active participant in my care. Medication therapy is only one part of my symptom management plan. In some cases, there may be limited scientific evidence to support the chronic use of certain medications to improve symptoms and daily function. Furthermore, in certain circumstances, there may be scientific information that suggests that the use of chronic controlled substances may worsen my symptoms and increase my risk of unintentional death directly related to this medication therapy. I know that if my provider feels my risk from controlled medications is greater than my benefit, I will have my controlled substance medication(s) compassionately lowered or removed altogether.      I further agree to allow this office to contact my HIPAA contact if there are concerns about my safety and use of the controlled medications. I have agreed to use the prescribed controlled substance medications to me as instructed by my provider and as stated in this Medication Agreement. My initial on each page and my signature below shows that I have read each page and I have had the opportunity to ask questions with answers provided by my provider.     Patient Name (Printed): _____________________________________  Patient Signature:  ______________________   Date: _____________    Prescriber Name (Printed): ___________________________________  Prescriber Signature: _____________________  Date: _____________               Daly Conklin NP

## 2023-03-17 NOTE — PROGRESS NOTES
[facility]   [address] 67[address], [address] [facility]  P: [phone]  F: [phone]    SUBJECTIVE     HPI:     Edmond An II is a 38 y.o. male who is seen in the clinic for   Chief Complaint   Patient presents with    Follow-up    Other     Would like a referral to see a hand doc due to fluid in knuckles       The patient presents today for a routine follow up. Routine labs were drawn on 2/23. Abnormal's are noted below. At his last appointment, he endorsed  chronic fatigue and problems with ED. TSH, CBC, and B12 levels were WNL. Testosterone levels were borderline low. Interested in starting TRT. He understands the risks involved. At his last appointment, he endorsed chronic tinnitus. Referral to ENT was placed. Has not made an appointment yet. At his last appointment, he endorsed snoring and was concerned that this may be causing his fatigue. Referral to Sleep Medicine was made. He has not made an appointment yet. At his last appointment, the patient endorsed worsening GERD-like symptoms. Pantoprazole 20 mg every day and prn Carafate 1 g QID was rx'd. Pantoprazole has improved his symptoms. Has not had to use prn Carafate. Scheduled to see Dr. Amor Plunkett (GI) next Thursday. At his last appointment, the patient endorsed worsening respiratory symptoms. Of note, he has a hx of Chronic Bronchitis. Referral to Pulmonology was placed. Saw Dr. Ankit Victor (Pulmonology) today, who stated that the patient likely has Asthma. Symbicort and prn Albuterol was rx'd. Total Cholesterol was 216, Triglycerides were 170, and LDL was 137. Diet has since improved. Exercises daily. BMI is 32. Hx of Chronic Bronchitis. Referral to Pulmonology was previously placed. Saw Dr. Ankit Victor (Pulmonology) today. Symbicort,     Cr was 1.34. He drinks at least 6 glasses of water per day. Denies any heavy NSAID use. Has noticed a cyst form on multiple fingers. Cyst drains serous drainage.      PDMP reviewed and is appropriate. Controlled Substance Agreement is signed and will be placed into the patient's chart. There are no problems to display for this patient. History reviewed. No pertinent past medical history. Past Surgical History:   Procedure Laterality Date    HX ORTHOPAEDIC      HX ORTHOPAEDIC      2 on right knee will have one on the left knee soon.       Social History     Socioeconomic History    Marital status:      Spouse name: Not on file    Number of children: Not on file    Years of education: Not on file    Highest education level: Not on file   Occupational History    Not on file   Tobacco Use    Smoking status: Never    Smokeless tobacco: Never   Vaping Use    Vaping Use: Never used   Substance and Sexual Activity    Alcohol use: Yes     Comment: occasional    Drug use: Never    Sexual activity: Yes     Partners: Female   Other Topics Concern    Not on file   Social History Narrative    ** Merged History Encounter **          Social Determinants of Health     Financial Resource Strain: Not on file   Food Insecurity: Not on file   Transportation Needs: Not on file   Physical Activity: Not on file   Stress: Not on file   Social Connections: Not on file   Intimate Partner Violence: Not on file   Housing Stability: Not on file     Family History   Problem Relation Age of Onset    No Known Problems Mother     No Known Problems Father      Immunization History   Administered Date(s) Administered    Hepatitis A Vaccine 02/01/2002, 04/12/2003    Hepatitis B Vaccine 04/12/2003, 06/19/2003, 03/14/2004    IPV 02/01/2002    Meningococcal Vaccine 02/01/2002    Smallpox Vaccine 11/19/2004    TDAP Vaccine 07/22/2009    Tdap 01/01/2012    Typhoid Vaccine 04/12/2003, 03/24/2005      Allergies   Allergen Reactions    Crab Hives     BLUE CRAB        Orders Only on 02/23/2023   Component Date Value Ref Range Status    Prostate Specific Ag 02/23/2023 1.0  0.0 - 4.0 ng/mL Final    Comment: (NOTE)  Roche ECLIA methodology. According to the American Urological Association, Serum PSA should  decrease and remain at undetectable levels after radical  prostatectomy. The AUA defines biochemical recurrence as an initial  PSA value 0.2 ng/mL or greater followed by a subsequent  confirmatory PSA value 0.2 ng/mL or greater. Values obtained with different assay methods or kits cannot be used  interchangeably. Results cannot be interpreted as absolute evidence  of the presence or absence of malignant disease. Reflex Criteria 02/23/2023 Comment    Final    Comment: (NOTE)  The percent free PSA is performed on a reflex basis only when the  total PSA is between 4.0 and 10.0 ng/mL. Performed At: [facility] [address], [place] [id number]  Kati Scales MD :[id number]      Testosterone 02/23/2023 314  264 - 916 ng/dL Final    Comment: (NOTE)  Adult male reference interval is based on a population of  healthy nonobese males (BMI <30) between 23and 44years old. [address] [place] [address],044;2257-1588. PMID: [id number]. Performed At: [facility] [place] [address] [id number]  Kati Scales MD SI:[id number]      Free testosterone (Direct) 02/23/2023 13.5  6.8 - 21.5 pg/mL Final    Comment: (NOTE)  Performed At: [facility] & [address] [id number]  Kati Scales MD DP:[id number]      Hep C virus Ab Interp. 02/23/2023 NONREACTIVE  NONREACTIVE   Final    Method used is Siemens Advia Centaur    Hemoglobin A1c 02/23/2023 5.2  4.0 - 5.6 % Final    Comment: NEW METHOD  PLEASE NOTE NEW REFERENCE RANGE  (NOTE)  HbA1C Interpretive Ranges  <5.7              Normal  5.7 - 6.4         Consider Prediabetes  >6.5              Consider Diabetes      Est. average glucose 02/23/2023 103  mg/dL Final    TSH 02/23/2023 2.110  0.450 - 4.500 uIU/mL Final    Comment: (NOTE)  No apparent thyroid disorder. Additional testing not indicated.  In  rare instances, Secondary Hypothyroidism as well as Subclinical  Hypothyroidism have been reported in some patients with normal TSH  values. Performed At: [facility] & [address] [id number]  Dulce Schofield MD DA:[id number]      Iron 02/23/2023 117  35 - 150 ug/dL Final    TIBC 02/23/2023 416  250 - 450 ug/dL Final    Iron % saturation 02/23/2023 28  20 - 50 % Final    Vitamin B12 02/23/2023 434  193 - 986 pg/mL Final    Cholesterol, total 02/23/2023 216 (A)  <200 MG/DL Final    Triglyceride 02/23/2023 170 (A)  <150 MG/DL Final    Based on NCEP-ATP III:  Triglycerides <150 mg/dL  is considered normal, 150-199 mg/dL  borderline high,  200-499 mg/dL high and  greater than or equal to 500 mg/dL very high. HDL Cholesterol 02/23/2023 45  MG/DL Final    Based on NCEP ATP III, HDL Cholesterol <40 mg/dL is considered low and >60 mg/dL is elevated.     LDL, calculated 02/23/2023 137 (A)  0 - 100 MG/DL Final    Comment: Based on the NCEP-ATP: LDL-C concentrations are considered  optimal <100 mg/dL,  near optimal/above Normal 100-129 mg/dL  Borderline High: 130-159, High: 160-189 mg/dL  Very High: Greater than or equal to 190 mg/dL      VLDL, calculated 02/23/2023 34  MG/DL Final    CHOL/HDL Ratio 02/23/2023 4.8  0.0 - 5.0   Final    WBC 02/23/2023 5.3  4.1 - 11.1 K/uL Final    RBC 02/23/2023 5.86 (A)  4.10 - 5.70 M/uL Final    HGB 02/23/2023 16.5  12.1 - 17.0 g/dL Final    HCT 02/23/2023 51.7 (A)  36.6 - 50.3 % Final    MCV 02/23/2023 88.2  80.0 - 99.0 FL Final    MCH 02/23/2023 28.2  26.0 - 34.0 PG Final    MCHC 02/23/2023 31.9  30.0 - 36.5 g/dL Final    RDW 02/23/2023 14.4  11.5 - 14.5 % Final    PLATELET 57/21/5598 347  150 - 400 K/uL Final    MPV 02/23/2023 11.8  8.9 - 12.9 FL Final    NRBC 02/23/2023 0.0  0  WBC Final    ABSOLUTE NRBC 02/23/2023 0.00  0.00 - 0.01 K/uL Final    NEUTROPHILS 02/23/2023 68  32 - 75 % Final    LYMPHOCYTES 02/23/2023 22  12 - 49 % Final    MONOCYTES 02/23/2023 9  5 - 13 % Final    EOSINOPHILS 02/23/2023 1  0 - 7 % Final    BASOPHILS 02/23/2023 0  0 - 1 % Final    IMMATURE GRANULOCYTES 02/23/2023 0  0.0 - 0.5 % Final    ABS. NEUTROPHILS 02/23/2023 3.6  1.8 - 8.0 K/UL Final    ABS. LYMPHOCYTES 02/23/2023 1.2  0.8 - 3.5 K/UL Final    ABS. MONOCYTES 02/23/2023 0.5  0.0 - 1.0 K/UL Final    ABS. EOSINOPHILS 02/23/2023 0.1  0.0 - 0.4 K/UL Final    ABS. BASOPHILS 02/23/2023 0.0  0.0 - 0.1 K/UL Final    ABS. IMM. GRANS. 02/23/2023 0.0  0.00 - 0.04 K/UL Final    DF 02/23/2023 AUTOMATED    Final    Sodium 02/23/2023 140  136 - 145 mmol/L Final    Potassium 02/23/2023 4.3  3.5 - 5.1 mmol/L Final    Chloride 02/23/2023 102  97 - 108 mmol/L Final    CO2 02/23/2023 30  21 - 32 mmol/L Final    Anion gap 02/23/2023 8  5 - 15 mmol/L Final    Glucose 02/23/2023 79  65 - 100 mg/dL Final    BUN 02/23/2023 15  6 - 20 MG/DL Final    Creatinine 02/23/2023 1.34 (A)  0.70 - 1.30 MG/DL Final    BUN/Creatinine ratio 02/23/2023 11 (A)  12 - 20   Final    eGFR 02/23/2023 >60  >60 ml/min/1.73m2 Final    Comment:   Pediatric calculator link: [facility].at. org/professionals/kdoqi/gfr_calculatorped    These results are not intended for use in patients <25years of age. eGFR results are calculated without a race factor using  the 2021 CKD-EPI equation. Careful clinical correlation is recommended, particularly when comparing to results calculated using previous equations. The CKD-EPI equation is less accurate in patients with extremes of muscle mass, extra-renal metabolism of creatinine, excessive creatine ingestion, or following therapy that affects renal tubular secretion. Calcium 02/23/2023 9.8  8.5 - 10.1 MG/DL Final    Bilirubin, total 02/23/2023 0.8  0.2 - 1.0 MG/DL Final    ALT (SGPT) 02/23/2023 48  12 - 78 U/L Final    AST (SGOT) 02/23/2023 31  15 - 37 U/L Final    Alk.  phosphatase 02/23/2023 91  45 - 117 U/L Final    Protein, total 02/23/2023 7.7  6.4 - 8.2 g/dL Final    Albumin 02/23/2023 4.5 3.5 - 5.0 g/dL Final    Globulin 02/23/2023 3.2  2.0 - 4.0 g/dL Final    A-G Ratio 02/23/2023 1.4  1.1 - 2.2   Final      US ABD LTD  Narrative: Ultrasound of the soft tissues. HISTORY: Mass along the left lower back for 3 years. Targeted ultrasound was performed of the area of interest. Both static and cine  images are submitted. Corresponding to the palpable finding is a discrete homogeneous 2.7 x 4.3 x 1.2  cm structure that is isoechoic to fat. This is located within the subcutaneous  layer and is reported to be pliable. It is located within a few millimeters of  the skin surface. 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 No ultrasound  characteristics for malignancy at this time. Current Outpatient Medications   Medication Sig Dispense Refill    testosterone (ANDROGEL) 20.25 mg/1.25 gram (1.62 %) gel Apply 41 mg to affected area daily. Max Daily Amount: 41 mg. Depress pump fully and rub into shoulders completely 1 Each 1    pantoprazole (PROTONIX) 20 mg tablet Take 1 Tablet by mouth daily. 30 Tablet 1           The past medical history, past surgical history, and family history were reviewed and updated in the medical record. Lab values/Imaging were reviewed. The medications were reviewed and updated in the medical record. Immunizations were reviewed and updated in the medical record. All relevant preventative screenings reviewed and updated in the medical record. REVIEW OF SYSTEMS   Review of Systems   Constitutional:  Positive for malaise/fatigue. Negative for chills, diaphoresis, fever and weight loss. HENT:  Positive for tinnitus. Negative for ear discharge and ear pain. Eyes:  Negative for blurred vision. Respiratory:  Negative for cough, sputum production, shortness of breath and wheezing. Gastrointestinal:  Negative for heartburn and nausea. Neurological:  Positive for weakness. Negative for dizziness and headaches. Psychiatric/Behavioral:  Negative for depression and memory loss. The patient is not nervous/anxious and does not have insomnia. PHYSICAL EXAM   /84 (BP 1 Location: Left upper arm, BP Patient Position: Sitting, BP Cuff Size: Adult long)   Pulse 66   Temp 97.1 °F (36.2 °C) (Temporal)   Resp 12   Ht 6' (1.829 m)   Wt 240 lb (108.9 kg)   SpO2 97%   BMI 32.55 kg/m²      Physical Exam  Constitutional:       General: He is not in acute distress. Appearance: He is not ill-appearing or diaphoretic. HENT:      Right Ear: Tympanic membrane, ear canal and external ear normal.      Left Ear: Tympanic membrane, ear canal and external ear normal.      Nose: No congestion or rhinorrhea. Mouth/Throat:      Pharynx: No oropharyngeal exudate or posterior oropharyngeal erythema. Eyes:      Pupils: Pupils are equal, round, and reactive to light. Cardiovascular:      Rate and Rhythm: Normal rate and regular rhythm. Pulses: Normal pulses. Heart sounds: Normal heart sounds. No murmur heard. Pulmonary:      Effort: Pulmonary effort is normal.      Breath sounds: Normal breath sounds. Abdominal:      General: Abdomen is flat. Bowel sounds are normal. There is no distension. Palpations: Abdomen is soft. There is no mass. Tenderness: There is no abdominal tenderness. There is no guarding or rebound. Hernia: No hernia is present. Skin:     Capillary Refill: Capillary refill takes less than 2 seconds. Comments: Soft, small cyst on multiple fingers   Neurological:      Mental Status: He is alert. Psychiatric:         Mood and Affect: Mood normal.          ASSESSMENT/ PLAN   Below is the assessment and plan developed based on review of pertinent history, physical exam, labs, studies, and medications. 1. Chronic bronchitis, unspecified chronic bronchitis type [facility])  Managed primarily by Pulmonology.    2. Moderate persistent asthma without complication  Managed primarily by Pulmonology. 3. Male hypogonadism  -     TESTOSTERONE, FREE & TOTAL; Future  -     testosterone (ANDROGEL) 20.25 mg/1.25 gram (1.62 %) gel; Apply 41 mg to affected area daily. Max Daily Amount: 41 mg. Depress pump fully and rub into shoulders completely, Normal, Disp-1 Each, R-1Appointment needed for future refills. 4. Chronic fatigue  -     TESTOSTERONE, FREE & TOTAL; Future  -     CBC WITH AUTOMATED DIFF; Future  -     testosterone (ANDROGEL) 20.25 mg/1.25 gram (1.62 %) gel; Apply 41 mg to affected area daily. Max Daily Amount: 41 mg. Depress pump fully and rub into shoulders completely, Normal, Disp-1 Each, R-1Appointment needed for future refills. 5. Erectile dysfunction, unspecified erectile dysfunction type  -     TESTOSTERONE, FREE & TOTAL; Future  -     testosterone (ANDROGEL) 20.25 mg/1.25 gram (1.62 %) gel; Apply 41 mg to affected area daily. Max Daily Amount: 41 mg. Depress pump fully and rub into shoulders completely, Normal, Disp-1 Each, R-1Appointment needed for future refills. 6. Tinnitus of both ears  Advised the patient to make an appointment with ENT. 7. Mixed hyperlipidemia  Educated the patient on the proper lifestyle modifications to help lower this. 8. Gastroesophageal reflux disease without esophagitis  Continue with current treatment regimen. Will appreciate future recs from GI.  9. Digital mucinous cyst of finger  -     REFERRAL TO DERMATOLOGY  10. Elevated serum creatinine  -     MICROALBUMIN, UR, 24 HR; Future  -     CREATININE, UR, 24 HR; Future  -     RENAL FUNCTION PANEL; Future  11. High risk medication use  -     COMPLIANCE DRUG SCREEN/PRESCRIPTION MONITORING; Future  12. Medication management  -     MICROALBUMIN, UR, 24 HR; Future  -     CREATININE, UR, 24 HR; Future  -     RENAL FUNCTION PANEL; Future  -     TESTOSTERONE, FREE & TOTAL; Future  -     COMPLIANCE DRUG SCREEN/PRESCRIPTION MONITORING; Future  -     CBC WITH AUTOMATED DIFF;  Future         Follow-up and Dispositions    Return in about 4 weeks (around 4/14/2023) for Re-assess [facility] and ED w/ Androgel. Disclaimer:  Advised patient to call back or return to office if symptoms worsen/change/persist.  Discussed expected course/resolution/complications of diagnosis in detail with patient. Medication risks/benefits/alternatives discussed with patient. Patient was given an after visit summary which includes diagnoses, current medications, & vitals. Discussed patient instructions and advised to read to all patient instructions regarding care. Patient expressed understanding with the diagnosis and plan.        Magui Nelson NP  3/17/2023

## 2023-03-17 NOTE — PROGRESS NOTES
Chief Complaint   Patient presents with    Follow-up       Health Maintenance Due   Topic    Pneumococcal 0-64 years (1 - PCV)        1. \"Have you been to the ER, urgent care clinic since your last visit? Hospitalized since your last visit? \" No    2. \"Have you seen or consulted any other health care providers outside of the [address] [place] [address] since your last visit? \" No     3. For patients aged 39-70: Has the patient had a colonoscopy / FIT/ Cologuard? NA - based on age      If the patient is female:    4. For patients aged 41-77: Has the patient had a mammogram within the past 2 years? NA - based on age or sex      11. For patients aged 21-65: Has the patient had a pap smear?  NA - based on age or sex     Visit Vitals  Ht 6' (1.829 m)   Wt 240 lb (108.9 kg)   BMI 32.55 kg/m²

## 2023-05-23 ENCOUNTER — TRANSCRIBE ORDERS (OUTPATIENT)
Facility: HOSPITAL | Age: 43
End: 2023-05-23

## 2023-05-23 DIAGNOSIS — K21.9 GASTROESOPHAGEAL REFLUX DISEASE WITHOUT ESOPHAGITIS: Primary | ICD-10-CM

## 2023-06-02 ENCOUNTER — HOSPITAL ENCOUNTER (OUTPATIENT)
Facility: HOSPITAL | Age: 43
Discharge: HOME | End: 2023-06-02
Attending: INTERNAL MEDICINE
Payer: COMMERCIAL

## 2023-06-02 DIAGNOSIS — K21.9 GASTROESOPHAGEAL REFLUX DISEASE WITHOUT ESOPHAGITIS: ICD-10-CM

## 2023-06-02 PROCEDURE — 74220 X-RAY XM ESOPHAGUS 1CNTRST: CPT
